# Patient Record
Sex: MALE | Race: WHITE | NOT HISPANIC OR LATINO | Employment: OTHER | URBAN - METROPOLITAN AREA
[De-identification: names, ages, dates, MRNs, and addresses within clinical notes are randomized per-mention and may not be internally consistent; named-entity substitution may affect disease eponyms.]

---

## 2017-01-25 ENCOUNTER — ALLSCRIPTS OFFICE VISIT (OUTPATIENT)
Dept: OTHER | Facility: OTHER | Age: 72
End: 2017-01-25

## 2017-01-25 ENCOUNTER — HOSPITAL ENCOUNTER (OUTPATIENT)
Dept: RADIOLOGY | Facility: CLINIC | Age: 72
Discharge: HOME | End: 2017-01-25
Payer: MEDICARE

## 2017-01-25 DIAGNOSIS — M25.552 PAIN IN LEFT HIP: ICD-10-CM

## 2017-01-25 PROCEDURE — 73502 X-RAY EXAM HIP UNI 2-3 VIEWS: CPT

## 2017-01-26 ENCOUNTER — GENERIC CONVERSION - ENCOUNTER (OUTPATIENT)
Dept: OTHER | Facility: OTHER | Age: 72
End: 2017-01-26

## 2017-01-26 ENCOUNTER — ALLSCRIPTS OFFICE VISIT (OUTPATIENT)
Dept: RADIOLOGY | Facility: CLINIC | Age: 72
End: 2017-01-26
Payer: MEDICARE

## 2017-02-01 ENCOUNTER — GENERIC CONVERSION - ENCOUNTER (OUTPATIENT)
Dept: OTHER | Facility: OTHER | Age: 72
End: 2017-02-01

## 2018-01-09 NOTE — RESULT NOTES
Message   Recorded as Task   Date: 01/31/2017 10:26 AM, Created By: Joann Carlton   Task Name: Follow Up   Assigned To: [facility],Team   Regarding Patient: Jose De Jesus Taylor, Status: Active   Comment:    Chata Paul - 31 Jan 2017 10:26 AM     TASK CREATED  S/p Left L4-5 TFESI #1 on 1/26/17 w/SL in [place]  LT L4-5 TFESI #2 scheduled for 2/10/17    Please call 2/2/17   Lily Landeros - 01 Feb 2017 12:52 PM     TASK EDITED  Spoke with pt- states his back feels good but his (L) groin is tight  Started PT 1/31/17 and IBU with some relief  Pain level 0/10   Feels about 100% relief  Task to SL for Meridian Coad - 01 Feb 2017 12:53 PM     TASK EDITED  He would like to CX his TFESI #2 since he is feeling better  Adria Encinas - 01 Feb 2017 1:21 PM     TASK REPLIED TO: Previously Assigned To Adria Encinas  please cancel-thank you   Penelope Wolf - 01 Feb 2017 1:26 PM     TASK EDITED  cancelled          Signatures   Electronically signed by : Venessa Almonte, ; Feb 1 2017  1:27PM EST                       (Author)

## 2018-01-10 NOTE — MISCELLANEOUS
Message   Recorded as Task   Date: 01/25/2017 03:56 PM, Created By: Mouna Patricks[place] [facility]   Task Name: Follow Up   Assigned To: [facility] [place] [facility],Team   Regarding Patient: Son Gonzalez, Status: In Progress   Comment:    Adria Encinas - 25 Jan 2017 3:56 PM     TASK CREATED  Please call patient let him know radiographs revealed mild hip arthritis  At this point as continue with the plan and pursue the lumbar injection, and that would serve both diagnostic and hopefully therapeutic purposes  Zay Rose - 26 Jan 2017 8:56 AM     TASK EDITED  pt seen for procedure on 1/26  LMOM to cb on home #     will review x-ray results prn  Zay Rose - 26 Jan 2017 10:02 AM     TASK EDITED  s/w pt, no questions re: x-ray results as reviewed by Dr Gallo Enriquez  pt to proceed w/ post procedure instructions as provided  Will cb w/ questions or concerns  Active Problems    1  Intervertebral disc disorder with radiculopathy of lumbosacral region (724 4) (M51 17)   2  Left hip pain (719 45) (M25 552)   3  Left low back pain (724 2) (M54 5)   4  Left lumbar radiculitis (724 4) (M54 16)   5  Lumbar foraminal stenosis (724 02) (M99 83)   6  Spinal stenosis of lumbosacral region (724 02) (M48 07)    Current Meds   1  Advil CAPS; Therapy: (Lesvia Rocher) to Recorded   2  Aspir-81 TBEC; Therapy: (Lesvia Rocher) to Recorded   3  Atorvastatin Calcium 20 MG Oral Tablet; Therapy: (Lesvia Rocher) to Recorded   4  CoQ10 CAPS; Therapy: (Lesvia Rocher) to Recorded   5  Lisinopril 20 MG Oral Tablet; Therapy: (Lesvia Rocher) to Recorded   6  Multivitamins TABS; Therapy: (Lesvia Rocher) to Recorded   7  Omeprazole TBEC; Therapy: (Lesvia Rocher) to Recorded   8  Probiotic CAPS; Therapy: (Lesvia Rocher) to Recorded   9  Tiger Chaseley Pain Relieving PTCH; Therapy: (Lesvia Rocher) to Recorded   10  Toprol XL 25 MG Oral Tablet Extended Release 24 Hour (Metoprolol Succinate ER);     Therapy: (Lyn Griggs) to Recorded   11  TraMADol HCl - 50 MG Oral Tablet; Therapy: (Recorded:25Jan2017) to Recorded    Allergies    1   No Known Drug Allergies    Signatures   Electronically signed by : Ramana Mata, ; Jan 26 2017 10:02AM EST                       (Author)

## 2018-01-13 VITALS
SYSTOLIC BLOOD PRESSURE: 150 MMHG | HEIGHT: 69 IN | WEIGHT: 217 LBS | HEART RATE: 84 BPM | TEMPERATURE: 98.6 F | DIASTOLIC BLOOD PRESSURE: 90 MMHG | BODY MASS INDEX: 32.14 KG/M2

## 2018-08-10 ENCOUNTER — HOSPITAL ENCOUNTER (EMERGENCY)
Facility: HOSPITAL | Age: 73
Discharge: HOME | End: 2018-08-10
Attending: EMERGENCY MEDICINE
Payer: MEDICARE

## 2018-08-10 VITALS
HEART RATE: 66 BPM | DIASTOLIC BLOOD PRESSURE: 63 MMHG | SYSTOLIC BLOOD PRESSURE: 135 MMHG | WEIGHT: 222 LBS | OXYGEN SATURATION: 97 % | TEMPERATURE: 97.8 F | BODY MASS INDEX: 32.78 KG/M2 | RESPIRATION RATE: 15 BRPM

## 2018-08-10 DIAGNOSIS — L03.115 CELLULITIS OF RIGHT LEG: Primary | ICD-10-CM

## 2018-08-10 PROCEDURE — 99283 EMERGENCY DEPT VISIT LOW MDM: CPT

## 2018-08-10 RX ORDER — SACCHAROMYCES BOULARDII 250 MG
250 CAPSULE ORAL 2 TIMES DAILY
COMMUNITY

## 2018-08-10 RX ORDER — B-COMPLEX WITH VITAMIN C
250 TABLET ORAL DAILY
COMMUNITY

## 2018-08-10 RX ORDER — AMLODIPINE BESYLATE 5 MG/1
5 TABLET ORAL DAILY
COMMUNITY

## 2018-08-10 RX ORDER — CEPHALEXIN 500 MG/1
500 CAPSULE ORAL EVERY 8 HOURS SCHEDULED
Qty: 21 CAPSULE | Refills: 0 | Status: SHIPPED | OUTPATIENT
Start: 2018-08-10 | End: 2018-08-17

## 2018-08-10 RX ORDER — LISINOPRIL 20 MG/1
20 TABLET ORAL 2 TIMES DAILY
COMMUNITY

## 2018-08-10 RX ORDER — GLUCOSAMINE/D3/BOSWELLIA SERRA 1500MG-400
5000 TABLET ORAL DAILY
COMMUNITY

## 2018-08-10 RX ORDER — ATORVASTATIN CALCIUM 20 MG/1
20 TABLET, FILM COATED ORAL DAILY
COMMUNITY

## 2018-08-10 RX ORDER — METOPROLOL SUCCINATE 25 MG/1
25 TABLET, EXTENDED RELEASE ORAL DAILY
COMMUNITY

## 2018-08-10 RX ORDER — LANOLIN ALCOHOL/MO/W.PET/CERES
5000 CREAM (GRAM) TOPICAL DAILY
COMMUNITY

## 2018-08-10 RX ORDER — CEPHALEXIN 250 MG/1
500 CAPSULE ORAL ONCE
Status: COMPLETED | OUTPATIENT
Start: 2018-08-10 | End: 2018-08-10

## 2018-08-10 RX ORDER — OMEPRAZOLE 20 MG/1
20 CAPSULE, DELAYED RELEASE ORAL DAILY
COMMUNITY

## 2018-08-10 RX ORDER — ASPIRIN 81 MG/1
81 TABLET ORAL DAILY
COMMUNITY

## 2018-08-10 RX ADMIN — CEPHALEXIN 500 MG: 250 CAPSULE ORAL at 21:50

## 2018-08-11 NOTE — DISCHARGE INSTRUCTIONS
Cellulitis, Ambulatory Care   GENERAL INFORMATION:   Cellulitis  is a skin infection caused by bacteria  Common symptoms include the following:   · Fever    · A red, warm, swollen area on your skin    · Pain when the area is touched    · Bumps or blisters (abscess) that may drain pus    · Bumpy, raised skin that feels like an orange peel  Seek immediate care for the following symptoms:   · An increase in pain, redness, warmth, and size    · Red streaks coming from the infected area    · A thin, gray-brown discharge coming from your infected skin area    · A crackling under your skin when you touch it    · Purple dots or bumps on your skin, or bleeding under your skin    · New swelling and pain in your legs    · Sudden trouble breathing or chest pain  Treatment for cellulitis  may include medicines to treat the bacterial infection or decrease pain  The infection may need to be cleaned out  Damaged, dead, or infected tissue may need to be cut away to help your wound heal   Manage your symptoms:   · Elevate your wound above the level of your heart  as often as you can  This will help decrease swelling and pain  Prop your wound on pillows or blankets to keep it elevated comfortably  · Clean your wound as directed  You may need to wash the wound with soap and water  Look for signs of infection  · Wear pressure stockings as directed  The stockings are tight and put pressure on your legs  This improves blood flow and decreases swelling  Prevent cellulitis:   · Wash your hands often  Use soap and water  Wash your hands after you use the bathroom, change a child's diapers, or sneeze  Wash your hands before you prepare or eat food  Use lotion to prevent dry, cracked skin  · Do not share personal items, such as towels, clothing, and razors  · Clean exercise equipment  with germ-killing  before and after you use it    Follow up with your healthcare provider as directed:  Write down your questions so you remember to ask them during your visits  CARE AGREEMENT:   You have the right to help plan your care  Learn about your health condition and how it may be treated  Discuss treatment options with your caregivers to decide what care you want to receive  You always have the right to refuse treatment  The above information is an  only  It is not intended as medical advice for individual conditions or treatments  Talk to your doctor, nurse or pharmacist before following any medical regimen to see if it is safe and effective for you  © 2014 [address] is for End User's use only and may not be sold, redistributed or otherwise used for commercial purposes  All illustrations and images included in CareNotes® are the copyrighted property of [employer]  or Altaf Nash

## 2018-08-11 NOTE — ED PROVIDER NOTES
History  Chief Complaint   Patient presents with    Knee Swelling     Redness and swelling behind left knee; currently is getting physical therapy to the knee for arthritic pain  68 yo male w/ hx of HTN and CAD presents to the ED for evaluation of R posterior knee swelling and redness x 24 hours  He undergoes PT for chronic R knee OA, states that his therapy has been helping  No new modalities used today  Denies pain or difficulty ambulating  No fever, chills or sweats  No recent surgical procedures or prolonged immobilization  Prior to Admission Medications   Prescriptions Last Dose Informant Patient Reported? Taking? Biotin 63741 MCG TABS   Yes Yes   Sig: Take 5,000 mcg by mouth daily   Thiamine HCl (VITAMIN B-1) 250 MG tablet   Yes Yes   Sig: Take 250 mg by mouth daily   amLODIPine (NORVASC) 5 mg tablet   Yes Yes   Sig: Take 5 mg by mouth daily   aspirin (ECOTRIN LOW STRENGTH) 81 mg EC tablet   Yes Yes   Sig: Take 81 mg by mouth daily   atorvastatin (LIPITOR) 20 mg tablet   Yes Yes   Sig: Take 20 mg by mouth daily   cyanocobalamin (VITAMIN B-12) 1,000 mcg tablet   Yes Yes   Sig: Take 5,000 mcg by mouth daily   lisinopril (ZESTRIL) 20 mg tablet   Yes Yes   Sig: Take 20 mg by mouth 2 (two) times a day   metoprolol succinate (TOPROL-XL) 25 mg 24 hr tablet   Yes Yes   Sig: Take 25 mg by mouth daily   omeprazole (PriLOSEC) 20 mg delayed release capsule   Yes Yes   Sig: Take 20 mg by mouth daily   saccharomyces boulardii (FLORASTOR) 250 mg capsule   Yes Yes   Sig: Take 250 mg by mouth 2 (two) times a day      Facility-Administered Medications: None       Past Medical History:   Diagnosis Date    Arthritis     Cardiac disease     GERD (gastroesophageal reflux disease)     Hypertension     Sleep apnea        Past Surgical History:   Procedure Laterality Date    CARDIAC SURGERY      CABG and 5 stents    JOINT REPLACEMENT         History reviewed  No pertinent family history    I have reviewed and agree with the history as documented  Social History   Substance Use Topics    Smoking status: Never Smoker    Smokeless tobacco: Never Used    Alcohol use No        Review of Systems   Constitutional: Negative for fever  Gastrointestinal: Negative for nausea and vomiting  Musculoskeletal: Negative for arthralgias, gait problem, joint swelling and myalgias  Skin: Positive for color change  Negative for wound  Neurological: Negative for dizziness, weakness and numbness  Physical Exam  Physical Exam   Constitutional: He is oriented to person, place, and time  He appears well-developed and well-nourished  No distress  HENT:   Head: Normocephalic and atraumatic  Eyes: Pupils are equal, round, and reactive to light  No scleral icterus  Neck: No JVD present  Cardiovascular: Normal rate and regular rhythm  Exam reveals no gallop and no friction rub  No murmur heard  Pulses:       Dorsalis pedis pulses are 2+ on the right side, and 2+ on the left side  Pulmonary/Chest: No respiratory distress  He has no wheezes  He has no rales  Musculoskeletal:        Right knee: He exhibits erythema  He exhibits normal range of motion, no effusion, no ecchymosis and no bony tenderness  No tenderness found  Legs:  Neurological: He is alert and oriented to person, place, and time  Skin: Skin is warm and dry  He is not diaphoretic  Vitals reviewed        Vital Signs  ED Triage Vitals [08/10/18 2125]   Temperature Pulse Respirations Blood Pressure SpO2   97 8 °F (36 6 °C) 73 15 (!) 176/80 98 %      Temp Source Heart Rate Source Patient Position - Orthostatic VS BP Location FiO2 (%)   Temporal Monitor -- -- --      Pain Score       2           Vitals:    08/10/18 2125 08/10/18 2145   BP: (!) 176/80 135/63   Pulse: 73 66       Visual Acuity      ED Medications  Medications   cephalexin (KEFLEX) capsule 500 mg (500 mg Oral Given 8/10/18 2150)       Diagnostic Studies  Results Reviewed     None No orders to display              Procedures  Procedures       Phone Contacts  ED Phone Contact    ED Course                               MDM  Number of Diagnoses or Management Options  Cellulitis of right leg: new and does not require workup  Diagnosis management comments: 66 yo male presents to the ED w/ new onset R leg erythema  Findings are suspicious for cellulitis  There is no knee effusion concerning for septic arthritis  Pt is well appearing and afebrile  Candidate for outpatient management  Amount and/or Complexity of Data Reviewed  Review and summarize past medical records: yes      CritCare Time    Disposition  Final diagnoses:   Cellulitis of right leg     Time reflects when diagnosis was documented in both MDM as applicable and the Disposition within this note     Time User Action Codes Description Comment    8/10/2018  9:44 PM Becky Nathaniel Add [K92 [id number]] Cellulitis of right leg       ED Disposition     ED Disposition Condition Comment    Discharge  Doreen Pacer Sefing discharge to home/self care      Condition at discharge: Good        Follow-up Information     Follow up With Specialties Details Why Contact Eileen Walker    [address]  [phone]            Discharge Medication List as of 8/10/2018  9:45 PM      START taking these medications    Details   cephalexin (KEFLEX) 500 mg capsule Take 1 capsule (500 mg total) by mouth every 8 (eight) hours for 7 days, Starting Fri 8/10/2018, Until Fri 8/17/2018, Print         CONTINUE these medications which have NOT CHANGED    Details   amLODIPine (NORVASC) 5 mg tablet Take 5 mg by mouth daily, Historical Med      aspirin (ECOTRIN LOW STRENGTH) 81 mg EC tablet Take 81 mg by mouth daily, Historical Med      atorvastatin (LIPITOR) 20 mg tablet Take 20 mg by mouth daily, Historical Med      Biotin 55136 MCG TABS Take 5,000 mcg by mouth daily, Historical Med      cyanocobalamin (VITAMIN B-12) 1,000 mcg tablet Take 5,000 mcg by mouth daily, Historical Med      lisinopril (ZESTRIL) 20 mg tablet Take 20 mg by mouth 2 (two) times a day, Historical Med      metoprolol succinate (TOPROL-XL) 25 mg 24 hr tablet Take 25 mg by mouth daily, Historical Med      omeprazole (PriLOSEC) 20 mg delayed release capsule Take 20 mg by mouth daily, Historical Med      saccharomyces boulardii (FLORASTOR) 250 mg capsule Take 250 mg by mouth 2 (two) times a day, Historical Med      Thiamine HCl (VITAMIN B-1) 250 MG tablet Take 250 mg by mouth daily, Historical Med           No discharge procedures on file      ED Provider  Electronically Signed by           Chandler Pagan PA-C  08/10/18 4851

## 2020-02-04 ENCOUNTER — OFFICE VISIT (OUTPATIENT)
Dept: PAIN MEDICINE | Facility: CLINIC | Age: 75
End: 2020-02-04
Payer: MEDICARE

## 2020-02-04 VITALS
HEIGHT: 70 IN | BODY MASS INDEX: 33.5 KG/M2 | SYSTOLIC BLOOD PRESSURE: 132 MMHG | DIASTOLIC BLOOD PRESSURE: 76 MMHG | WEIGHT: 234 LBS | HEART RATE: 63 BPM

## 2020-02-04 DIAGNOSIS — M54.16 LUMBAR RADICULOPATHY: ICD-10-CM

## 2020-02-04 DIAGNOSIS — M51.26 LUMBAR DISC HERNIATION: Primary | ICD-10-CM

## 2020-02-04 DIAGNOSIS — M79.18 MYOFASCIAL PAIN SYNDROME: ICD-10-CM

## 2020-02-04 DIAGNOSIS — M48.07 SPINAL STENOSIS OF LUMBOSACRAL REGION: ICD-10-CM

## 2020-02-04 PROBLEM — M25.552 LEFT HIP PAIN: Status: ACTIVE | Noted: 2017-01-25

## 2020-02-04 PROBLEM — M51.17 INTERVERTEBRAL DISC DISORDER WITH RADICULOPATHY OF LUMBOSACRAL REGION: Status: ACTIVE | Noted: 2017-01-25

## 2020-02-04 PROBLEM — M48.061 LUMBAR FORAMINAL STENOSIS: Status: ACTIVE | Noted: 2017-01-25

## 2020-02-04 PROBLEM — M54.50 LEFT LOW BACK PAIN: Status: ACTIVE | Noted: 2017-01-25

## 2020-02-04 PROCEDURE — 99214 OFFICE O/P EST MOD 30 MIN: CPT | Performed by: ANESTHESIOLOGY

## 2020-02-04 NOTE — PROGRESS NOTES
Assessment  1  Lumbar disc herniation    2  Lumbar radiculopathy    3  Myofascial pain syndrome    4  Spinal stenosis of lumbosacral region        Plan  In review patient was seen three years ago received one epidural steroid injection with excellent relief  Pain started return approximately six months ago  I believe his pain is muliti-modal     I believe the thoracic and scapular pain is secondary to trigger point activity in for I have referred him to Dr Christ Pollard for evaluation and consideration of treatment  Regards to his low back and right leg pain I do believe it be appropriate to proceed with lumbar epidural steroid injection  Initially use an interlaminar approach he does have some bilateral symptoms  Complete risks and benefits including bleeding, infection, tissue reaction, nerve injury and allergic reaction were discussed  The approach was demonstrated using models and literature was provided  Verbal and written consent was obtained  My impressions and treatment recommendations were discussed in detail with the patient who verbalized understanding and had no further questions  Discharge instructions were provided  I personally saw and examined the patient and I agree with the above discussed plan of care  This note is created using dictation transcription  It may contain typographical errors, grammatical errors, improperly dictated words, background noise and other errors  Orders Placed This Encounter   Procedures    FL spine and pain procedure     Standing Status:   Future     Standing Expiration Date:   2/4/2024     Order Specific Question:   Reason for Exam:     Answer:   LESI 1  Order Specific Question:   Anticoagulant hold needed? Answer:   No    FL spine and pain procedure     Standing Status:   Future     Standing Expiration Date:   2/4/2024     Order Specific Question:   Reason for Exam:     Answer:   LESI 2  Order Specific Question:   Anticoagulant hold needed? Answer:   No    Ambulatory referral to Neurology     Standing Status:   Future     Standing Expiration Date:   2/4/2021     Referral Priority:   Routine     Referral Type:   Consult - AMB     Referral Reason:   Specialty Services Required     Referred to Provider:   Charu Salomon     Requested Specialty:   Neurology     Number of Visits Requested:   1     Expiration Date:   2/4/2021         History of Present Illness    Annie Castillo is a 75 y o  male who was seen approximately three years ago for left lower extremity symptoms  He underwent a transforaminal epidural steroid injections with two and half years of relief  His pain is somewhat different in nature in character does cross his low back occasionally into his right leg  He also has pain as scapular and thoracic region  His symptoms are moderate to severe rates anywhere between six to 7/10 on the visual analog scale interfering with his daily living activities  His pain is intermittent but worse in the evening describes dull achy denies any weakness of the upper lower limbs  He does report that lying down, walking, relaxation decreases symptoms will standing, bending and sitting aggravate them  Physical therapy exercise heat nice have provided moderate relief  Denies any bowel or bladder dysfunction  I have personally reviewed and/or updated the patient's past medical history, past surgical history, family history, social history, current medications, allergies, and vital signs today  Review of Systems   Constitutional: Negative for fever and unexpected weight change  HENT: Negative for trouble swallowing  Eyes: Negative for visual disturbance  Respiratory: Negative for shortness of breath and wheezing  Cardiovascular: Negative for chest pain and palpitations  Gastrointestinal: Negative for constipation, diarrhea, nausea and vomiting  Endocrine: Negative for cold intolerance, heat intolerance and polydipsia     Genitourinary: Negative for difficulty urinating and frequency  Musculoskeletal: Positive for gait problem (decreased rom) and joint swelling (joint stiffness)  Negative for arthralgias and myalgias  Skin: Negative for rash  Neurological: Negative for dizziness, seizures, syncope, weakness and headaches  Hematological: Does not bruise/bleed easily  Psychiatric/Behavioral: Negative for dysphoric mood  All other systems reviewed and are negative  Patient Active Problem List   Diagnosis    Intervertebral disc disorder with radiculopathy of lumbosacral region    Left hip pain    Left low back pain    Lumbar foraminal stenosis    Spinal stenosis of lumbosacral region       Past Medical History:   Diagnosis Date    Arthritis     Cardiac disease     GERD (gastroesophageal reflux disease)     Hypertension     Sleep apnea        Past Surgical History:   Procedure Laterality Date    CARDIAC SURGERY      CABG and 5 stents    JOINT REPLACEMENT         History reviewed  No pertinent family history      Social History     Occupational History    Not on file   Tobacco Use    Smoking status: Never Smoker    Smokeless tobacco: Never Used   Substance and Sexual Activity    Alcohol use: No    Drug use: No    Sexual activity: Not on file       Current Outpatient Medications on File Prior to Visit   Medication Sig    amLODIPine (NORVASC) 5 mg tablet Take 5 mg by mouth daily    aspirin (ECOTRIN LOW STRENGTH) 81 mg EC tablet Take 81 mg by mouth daily    atorvastatin (LIPITOR) 20 mg tablet Take 20 mg by mouth daily    Biotin 41102 MCG TABS Take 5,000 mcg by mouth daily    cyanocobalamin (VITAMIN B-12) 1,000 mcg tablet Take 5,000 mcg by mouth daily    lisinopril (ZESTRIL) 20 mg tablet Take 20 mg by mouth 2 (two) times a day    metoprolol succinate (TOPROL-XL) 25 mg 24 hr tablet Take 25 mg by mouth daily    omeprazole (PriLOSEC) 20 mg delayed release capsule Take 20 mg by mouth daily    saccharomyces boulardii (FLORASTOR) 250 mg capsule Take 250 mg by mouth 2 (two) times a day    Thiamine HCl (VITAMIN B-1) 250 MG tablet Take 250 mg by mouth daily     No current facility-administered medications on file prior to visit  No Known Allergies    Physical Exam    /76   Pulse 63   Ht 5' 10" (1 778 m)   Wt 106 kg (234 lb)   BMI 33 58 kg/m²     Constitutional: normal, well developed, well nourished, alert, in no distress and non-toxic and no overt pain behavior  and overweight  Eyes: anicteric  HEENT: grossly intact  Neck: supple, symmetric, trachea midline and no masses   Pulmonary:even and unlabored  Cardiovascular:No edema or pitting edema present  Skin:Normal without rashes or lesions and well hydrated  Psychiatric:Mood and affect appropriate  Neurologic:Cranial Nerves II-XII grossly intact  Musculoskeletal:normal, difficulty going from sitting to standing to sitting position no obvious skin lesions or erythema lumbar sacral spine no tenderness to palpation lumbar paravertebral is there is right greater than left thoracic paravertebral tenderness and left greater than right scapular tenderness  No motor deficit appreciated the upper lower limbs, deep tendon reflexes are 2+ and symmetrical bilateral upper lower limbs, no sensory deficit appreciated lower limbs negative bilateral straight leg raising and negative Kye's maneuver  Imaging  MRI:  Lumbar 12/28/16  :     Multilevel degenerative disc disease    Mild thecal sac stenosis across the L1-L2 and L2-L3 levels, as discussed above    Right lateral recess stenosis at the L3-L4 level and bilateral recess stenosis at the L4-L5 Level    Left neural foraminal stenosis at the L5-S1 level

## 2020-02-06 ENCOUNTER — HOSPITAL ENCOUNTER (OUTPATIENT)
Dept: RADIOLOGY | Facility: CLINIC | Age: 75
Discharge: HOME | End: 2020-02-06
Attending: ANESTHESIOLOGY
Payer: MEDICARE

## 2020-02-06 VITALS
TEMPERATURE: 97.7 F | RESPIRATION RATE: 20 BRPM | DIASTOLIC BLOOD PRESSURE: 93 MMHG | OXYGEN SATURATION: 97 % | SYSTOLIC BLOOD PRESSURE: 167 MMHG | HEART RATE: 63 BPM

## 2020-02-06 DIAGNOSIS — M51.26 LUMBAR DISC HERNIATION: ICD-10-CM

## 2020-02-06 DIAGNOSIS — M48.07 SPINAL STENOSIS OF LUMBOSACRAL REGION: ICD-10-CM

## 2020-02-06 DIAGNOSIS — M54.16 LUMBAR RADICULOPATHY: ICD-10-CM

## 2020-02-06 PROCEDURE — 62323 NJX INTERLAMINAR LMBR/SAC: CPT | Performed by: ANESTHESIOLOGY

## 2020-02-06 RX ORDER — LIDOCAINE HYDROCHLORIDE 10 MG/ML
5 INJECTION, SOLUTION EPIDURAL; INFILTRATION; INTRACAUDAL; PERINEURAL ONCE
Status: COMPLETED | OUTPATIENT
Start: 2020-02-06 | End: 2020-02-06

## 2020-02-06 RX ORDER — B-COMPLEX WITH VITAMIN C
TABLET ORAL EVERY 24 HOURS
COMMUNITY

## 2020-02-06 RX ORDER — METHYLPREDNISOLONE ACETATE 80 MG/ML
160 INJECTION, SUSPENSION INTRA-ARTICULAR; INTRALESIONAL; INTRAMUSCULAR; PARENTERAL; SOFT TISSUE ONCE
Status: COMPLETED | OUTPATIENT
Start: 2020-02-06 | End: 2020-02-06

## 2020-02-06 RX ADMIN — IOHEXOL 1 ML: 300 INJECTION, SOLUTION INTRAVENOUS at 09:15

## 2020-02-06 RX ADMIN — LIDOCAINE HYDROCHLORIDE 3 ML: 10 INJECTION, SOLUTION EPIDURAL; INFILTRATION; INTRACAUDAL; PERINEURAL at 09:14

## 2020-02-06 RX ADMIN — METHYLPREDNISOLONE ACETATE 160 MG: 80 INJECTION, SUSPENSION INTRA-ARTICULAR; INTRALESIONAL; INTRAMUSCULAR; SOFT TISSUE at 09:16

## 2020-02-06 NOTE — H&P
History of Present Illness: The patient is a 75 y o  male who presents with complaints of low back and leg pain      Patient Active Problem List   Diagnosis    Intervertebral disc disorder with radiculopathy of lumbosacral region    Left hip pain    Left low back pain    Lumbar foraminal stenosis    Spinal stenosis of lumbosacral region    Lumbar disc herniation    Lumbar radiculopathy       Past Medical History:   Diagnosis Date    Arthritis     Cardiac disease     GERD (gastroesophageal reflux disease)     Hypertension     Sleep apnea        Past Surgical History:   Procedure Laterality Date    CARDIAC SURGERY      CABG and 5 stents    JOINT REPLACEMENT           Current Outpatient Medications:     amLODIPine (NORVASC) 5 mg tablet, Take 5 mg by mouth daily, Disp: , Rfl:     aspirin (ECOTRIN LOW STRENGTH) 81 mg EC tablet, Take 81 mg by mouth daily, Disp: , Rfl:     atorvastatin (LIPITOR) 20 mg tablet, Take 20 mg by mouth daily, Disp: , Rfl:     Biotin 99618 MCG TABS, Take 5,000 mcg by mouth daily, Disp: , Rfl:     cyanocobalamin (VITAMIN B-12) 1,000 mcg tablet, Take 5,000 mcg by mouth daily, Disp: , Rfl:     lisinopril (ZESTRIL) 20 mg tablet, Take 20 mg by mouth 2 (two) times a day, Disp: , Rfl:     Magnesium 400 MG CAPS, , Disp: , Rfl:     metoprolol succinate (TOPROL-XL) 25 mg 24 hr tablet, Take 25 mg by mouth daily, Disp: , Rfl:     Misc Natural Products (PROSTATE HEALTH PO), every 24 hours, Disp: , Rfl:     omeprazole (PriLOSEC) 20 mg delayed release capsule, Take 20 mg by mouth daily, Disp: , Rfl:     Pediatric Multivitamins-Fl (MULTIVITAMINS/FL PO), every 24 hours, Disp: , Rfl:     saccharomyces boulardii (FLORASTOR) 250 mg capsule, Take 250 mg by mouth 2 (two) times a day, Disp: , Rfl:     Thiamine HCl (VITAMIN B-1) 250 MG tablet, Take 250 mg by mouth daily, Disp: , Rfl:     Thiamine HCl (VITAMIN B-1) 250 MG tablet, every 24 hours, Disp: , Rfl:     Current Facility-Administered Medications:     iohexol (OMNIPAQUE) 300 mg/mL injection 50 mL, 50 mL, Epidural, Once, Adria Encinas DO    lidocaine (PF) (XYLOCAINE-MPF) 1 % injection 5 mL, 5 mL, Other, Once, Adria Encinas DO    methylPREDNISolone acetate (DEPO-MEDROL) injection 160 mg, 160 mg, Epidural, Once, Adria Encinas DO    No Known Allergies    Physical Exam:   Vitals:    02/06/20 0907   BP: 150/83   Pulse: 59   Resp: 20   Temp: 97 7 °F (36 5 °C)   SpO2: 96%     General: Awake, Alert, Oriented x 3, Mood and affect appropriate  Respiratory: Respirations even and unlabored  Cardiovascular: Peripheral pulses intact; no edema  Musculoskeletal Exam:  Decreased range of motion lumbar spine    ASA Score: II    Patient/Chart Verification  Patient ID Verified: Verbal  ID Band Applied: No  Consents Confirmed: Procedural  H&P( within 30 days) Verified: To be obtained in the Pre-Procedure area  Interval H&P(within 24 hr) Complete (required for Outpatients and Surgery Admit only): To be obtained in the Pre-Procedure area  Allergies Reviewed: Yes  Anticoag/NSAID held?: NA  Currently on antibiotics?: No    Assessment:   1  Lumbar disc herniation    2  Lumbar radiculopathy    3  Spinal stenosis of lumbosacral region        Plan: BARNEY Berg

## 2020-02-06 NOTE — DISCHARGE INSTRUCTIONS
Epidural Steroid Injection   WHAT YOU NEED TO KNOW:   An epidural steroid injection (MARTINEZ) is a procedure to inject steroid medicine into the epidural space  The epidural space is between your spinal cord and vertebrae  Steroids reduce inflammation and fluid buildup in your spine that may be causing pain  You may be given pain medicine along with the steroids  ACTIVITY  · Do not drive or operate machinery today  · No strenuous activity today - bending, lifting, etc   · You may resume normal activites starting tomorrow - start slowly and as tolerated  · You may shower today, but no tub baths or hot tubs  · You may have numbness for several hours from the local anesthetic  Please use caution and common sense, especially with weight-bearing activities  CARE OF THE INJECTION SITE  · If you have soreness or pain, apply ice to the area today (20 minutes on/20 minutes off)  · Starting tomorrow, you may use warm, moist heat or ice if needed  · You may have an increase or change in your discomfort for 36-48 hours after your treatment  · Apply ice and continue with any pain medication you have been prescribed  · [facility] if you have any of the following: redness, drainage, swelling, headache, stiff neck or fever above 100°F     SPECIAL INSTRUCTIONS  · Our office will contact you in approximately 7 days for a progress report  MEDICATIONS  · Continue to take all routine medications  · Our office may have instructed you to hold some medications  If you have a problem specifically related to your procedure, please call our office at [phone]  Problems not related to your procedure should be directed to your primary care physician

## 2020-02-13 ENCOUNTER — TELEPHONE (OUTPATIENT)
Dept: PAIN MEDICINE | Facility: CLINIC | Age: 75
End: 2020-02-13

## 2020-02-13 NOTE — TELEPHONE ENCOUNTER
Dr Isabela Madrigal Procedure F/u (S/pLESI #1  F/u LESI #2 2/24  )       Spoke with pt who states that his pain is a 2 with  50% relief from the injection and that he will be coming in for the next one to hopefully get a little more relief       Follow up injection 2/24 LES #2

## 2020-02-24 ENCOUNTER — HOSPITAL ENCOUNTER (OUTPATIENT)
Dept: RADIOLOGY | Facility: CLINIC | Age: 75
Discharge: HOME | End: 2020-02-24
Attending: ANESTHESIOLOGY | Admitting: ANESTHESIOLOGY
Payer: MEDICARE

## 2020-02-24 VITALS
HEART RATE: 55 BPM | DIASTOLIC BLOOD PRESSURE: 75 MMHG | RESPIRATION RATE: 20 BRPM | OXYGEN SATURATION: 98 % | SYSTOLIC BLOOD PRESSURE: 137 MMHG | TEMPERATURE: 97.5 F

## 2020-02-24 DIAGNOSIS — M54.16 LUMBAR RADICULOPATHY: ICD-10-CM

## 2020-02-24 DIAGNOSIS — M48.07 SPINAL STENOSIS OF LUMBOSACRAL REGION: ICD-10-CM

## 2020-02-24 DIAGNOSIS — M51.26 LUMBAR DISC HERNIATION: ICD-10-CM

## 2020-02-24 PROCEDURE — 62323 NJX INTERLAMINAR LMBR/SAC: CPT | Performed by: ANESTHESIOLOGY

## 2020-02-24 RX ORDER — LIDOCAINE HYDROCHLORIDE 10 MG/ML
5 INJECTION, SOLUTION EPIDURAL; INFILTRATION; INTRACAUDAL; PERINEURAL ONCE
Status: COMPLETED | OUTPATIENT
Start: 2020-02-24 | End: 2020-02-24

## 2020-02-24 RX ORDER — METHYLPREDNISOLONE ACETATE 80 MG/ML
160 INJECTION, SUSPENSION INTRA-ARTICULAR; INTRALESIONAL; INTRAMUSCULAR; PARENTERAL; SOFT TISSUE ONCE
Status: COMPLETED | OUTPATIENT
Start: 2020-02-24 | End: 2020-02-24

## 2020-02-24 RX ADMIN — LIDOCAINE HYDROCHLORIDE 3 ML: 10 INJECTION, SOLUTION EPIDURAL; INFILTRATION; INTRACAUDAL; PERINEURAL at 09:49

## 2020-02-24 RX ADMIN — IOHEXOL 1 ML: 300 INJECTION, SOLUTION INTRAVENOUS at 09:50

## 2020-02-24 RX ADMIN — METHYLPREDNISOLONE ACETATE 160 MG: 80 INJECTION, SUSPENSION INTRA-ARTICULAR; INTRALESIONAL; INTRAMUSCULAR; SOFT TISSUE at 09:51

## 2020-02-24 NOTE — H&P
History of Present Illness: The patient is a 75 y o  male who presents with complaints of low back and leg pain      Patient Active Problem List   Diagnosis    Intervertebral disc disorder with radiculopathy of lumbosacral region    Left hip pain    Left low back pain    Lumbar foraminal stenosis    Spinal stenosis of lumbosacral region    Lumbar disc herniation    Lumbar radiculopathy       Past Medical History:   Diagnosis Date    Arthritis     Cardiac disease     GERD (gastroesophageal reflux disease)     Hypertension     Sleep apnea        Past Surgical History:   Procedure Laterality Date    CARDIAC SURGERY      CABG and 5 stents    JOINT REPLACEMENT           Current Outpatient Medications:     amLODIPine (NORVASC) 5 mg tablet, Take 5 mg by mouth daily, Disp: , Rfl:     aspirin (ECOTRIN LOW STRENGTH) 81 mg EC tablet, Take 81 mg by mouth daily, Disp: , Rfl:     atorvastatin (LIPITOR) 20 mg tablet, Take 20 mg by mouth daily, Disp: , Rfl:     Biotin 25434 MCG TABS, Take 5,000 mcg by mouth daily, Disp: , Rfl:     cyanocobalamin (VITAMIN B-12) 1,000 mcg tablet, Take 5,000 mcg by mouth daily, Disp: , Rfl:     lisinopril (ZESTRIL) 20 mg tablet, Take 20 mg by mouth 2 (two) times a day, Disp: , Rfl:     Magnesium 400 MG CAPS, , Disp: , Rfl:     metoprolol succinate (TOPROL-XL) 25 mg 24 hr tablet, Take 25 mg by mouth daily, Disp: , Rfl:     Misc Natural Products (PROSTATE HEALTH PO), every 24 hours, Disp: , Rfl:     omeprazole (PriLOSEC) 20 mg delayed release capsule, Take 20 mg by mouth daily, Disp: , Rfl:     Pediatric Multivitamins-Fl (MULTIVITAMINS/FL PO), every 24 hours, Disp: , Rfl:     saccharomyces boulardii (FLORASTOR) 250 mg capsule, Take 250 mg by mouth 2 (two) times a day, Disp: , Rfl:     Thiamine HCl (VITAMIN B-1) 250 MG tablet, Take 250 mg by mouth daily, Disp: , Rfl:     Thiamine HCl (VITAMIN B-1) 250 MG tablet, every 24 hours, Disp: , Rfl:     Current Facility-Administered Medications:     iohexol (OMNIPAQUE) 300 mg/mL injection 50 mL, 50 mL, Epidural, Once, Adria Encinas DO    lidocaine (PF) (XYLOCAINE-MPF) 1 % injection 5 mL, 5 mL, Other, Once, Adira Encinas DO    methylPREDNISolone acetate (DEPO-MEDROL) injection 160 mg, 160 mg, Epidural, Once, Adria Encinas DO    No Known Allergies    Physical Exam:   General: Awake, Alert, Oriented x 3, Mood and affect appropriate  Respiratory: Respirations even and unlabored  Cardiovascular: Peripheral pulses intact; no edema  Musculoskeletal Exam:  Decreased range of motion lumbar spine    ASA Score: II         Assessment:   1  Lumbar disc herniation    2  Lumbar radiculopathy    3  Spinal stenosis of lumbosacral region        Plan: LESI 2

## 2020-02-24 NOTE — DISCHARGE INSTRUCTIONS
Epidural Steroid Injection   WHAT YOU NEED TO KNOW:   An epidural steroid injection (MARTINEZ) is a procedure to inject steroid medicine into the epidural space  The epidural space is between your spinal cord and vertebrae  Steroids reduce inflammation and fluid buildup in your spine that may be causing pain  You may be given pain medicine along with the steroids  ACTIVITY  · Do not drive or operate machinery today  · No strenuous activity today - bending, lifting, etc   · You may resume normal activites starting tomorrow - start slowly and as tolerated  · You may shower today, but no tub baths or hot tubs  · You may have numbness for several hours from the local anesthetic  Please use caution and common sense, especially with weight-bearing activities  CARE OF THE INJECTION SITE  · If you have soreness or pain, apply ice to the area today (20 minutes on/20 minutes off)  · Starting tomorrow, you may use warm, moist heat or ice if needed  · You may have an increase or change in your discomfort for 36-48 hours after your treatment  · Apply ice and continue with any pain medication you have been prescribed  · [facility] if you have any of the following: redness, drainage, swelling, headache, stiff neck or fever above 100°F     SPECIAL INSTRUCTIONS  · Our office will contact you in approximately 7 days for a progress report  MEDICATIONS  · Continue to take all routine medications  · Our office may have instructed you to hold some medications  If you have a problem specifically related to your procedure, please call our office at [phone]  Problems not related to your procedure should be directed to your primary care physician

## 2020-03-02 ENCOUNTER — TELEPHONE (OUTPATIENT)
Dept: PAIN MEDICINE | Facility: CLINIC | Age: 75
End: 2020-03-02

## 2020-03-25 PROBLEM — G89.4 CHRONIC PAIN SYNDROME: Status: ACTIVE | Noted: 2020-03-25

## 2020-07-14 ENCOUNTER — OFFICE VISIT (OUTPATIENT)
Dept: PAIN MEDICINE | Facility: CLINIC | Age: 75
End: 2020-07-14
Payer: MEDICARE

## 2020-07-14 VITALS
WEIGHT: 234 LBS | TEMPERATURE: 98.6 F | SYSTOLIC BLOOD PRESSURE: 116 MMHG | BODY MASS INDEX: 33.5 KG/M2 | HEIGHT: 70 IN | DIASTOLIC BLOOD PRESSURE: 70 MMHG

## 2020-07-14 DIAGNOSIS — M48.062 SPINAL STENOSIS OF LUMBAR REGION WITH NEUROGENIC CLAUDICATION: Primary | ICD-10-CM

## 2020-07-14 DIAGNOSIS — M54.16 LUMBAR RADICULOPATHY: ICD-10-CM

## 2020-07-14 PROCEDURE — 99213 OFFICE O/P EST LOW 20 MIN: CPT | Performed by: ANESTHESIOLOGY

## 2020-07-14 NOTE — PATIENT INSTRUCTIONS
Lumbar Radiculopathy   WHAT YOU NEED TO KNOW:   What is lumbar radiculopathy? Lumbar radiculopathy is a painful condition that happens when a nerve in your lumbar spine (lower back) is pinched or irritated  Nerves control feeling and movement in your body  What causes lumbar radiculopathy? You may get a pinched nerve in your lumbar spine if you have disc damage  Discs are natural, spongy cushions between your vertebrae (back bones) that allow your spine to move  Your discs may move out of place and pinch the nerve in your spine  Your risk for a pinched nerve and lumbar radiculopathy increases if:  · You smoke  · You have diabetes, a spinal infection, or a growth in your spine  · You are overweight  · You are male  · You are elderly  What are the signs and symptoms of lumbar radiculopathy? You may have any of the following:  · Pain that moves from your lower back to your buttocks, groin, and the back of your leg  The pain is often felt below your knee  Your pain may worsen when you cough, sneeze, stand, or sit  · Numbness, weakness, or tingling in your back or legs  How is lumbar radiculopathy diagnosed? Your healthcare provider will examine you and ask about your family history of back and leg pain  He may also test you for weakness, numbness, or tingling in your back, buttocks, and legs  Your healthcare provider may ask you to lie on your back and lift your leg to locate your pain  You may have any of the following:  · Blood tests: You may need blood taken to give caregivers information about how your body is working  The blood may be taken from your hand, arm, or IV  · Magnetic resonance imaging (MRI): An MRI machine is used to take a picture of your lower back  Your healthcare provider will use this picture to check for problems and changes in your back bones, nerves, and discs  You will need to lie still during this test  The MRI machine contains a very powerful magnet    Never enter the MRI room with any metal objects  This can cause serious injury  Tell your healthcare provider if you have any metal implants in your body  · X-ray: An x-ray is a picture of your lower back  A lumbar x-ray may show signs of infection or other problems with your spine  · An electromyography (EMG)  test measures the electrical activity of your muscles at rest and with movement  · Computed tomography (CT) scan: A special x-ray machine uses a computer to take pictures of your lower back  It may be used to look at your bones, discs, and nerves  You may be given dye in your IV to help improve the pictures  Tell your healthcare provider if you are allergic to shellfish, or have other allergies or medical conditions  People who are allergic to iodine or shellfish (lobster, crab, or shrimp) may be allergic to some dyes  How is lumbar radiculopathy treated? Treatment of lumbar radiculopathy may reduce pain and swelling, and improve your ability to walk and do your normal activities  Ask your healthcare provider for more information about these and other treatments for lumbar radiculopathy:  · Medicines:     ¨ NSAIDs , such as ibuprofen, help decrease swelling, pain, and fever  This medicine is available with or without a doctor's order  NSAIDs can cause stomach bleeding or kidney problems in certain people  If you take blood thinner medicine, always ask your healthcare provider if NSAIDs are safe for you  Always read the medicine label and follow directions  ¨ Muscle relaxers  help decrease pain and muscle spasms  ¨ Opioids: This is a strong medicine given to reduce severe pain  It is also called narcotic pain medicine  Take this medicine exactly as directed by your healthcare provider  ¨ Oral steroids: Steroids may be given to reduce swelling and pain  ¨ Steroid injections: Healthcare providers may give you steroid medicine through a needle (shot) into your lumbar spine   This may help decrease your nerve pain and swelling  You may need more than 1 injection if your symptoms do not improve after the first treatment  · Physical therapy: Your healthcare provider may suggest physical therapy  Your physical therapist may teach you certain exercises to improve posture (the way you stand and sit), flexibility, and strength in your lower back  He may also teach you how to remain safely active and avoid further injury  Follow the exercise plan given to you by your physical therapist     · Transcutaneous electrical nerve stimulation: This treatment, called TENS, stimulates your nerves and may decrease your pain  Wires are attached to pads  The pads are attached to your skin  The wires send a mild current through your nerves  · Surgery: You may need surgery to relieve your pinched nerve if your condition has not improved within 4 to 6 weeks  You may also need it if you have lumbar radiculopathy more than once  What are the risks of treatment for lumbar radiculopathy? · Without treatment, your pain may worsen  The pinched and swollen nerve may lead to problems when you walk or move  In severe cases, you may lose control of your urine or bowel movements  Bedrest can make your symptoms worse  · Pain medicines can cause vomiting, upset stomach, constipation (large, hard bowel movements that are difficult to pass), or kidney or liver problems  Opioid medicine may be addictive (hard to quit taking once you start)  Oral steroids and steroid injections may have side effects, such as facial redness, fluid retention (water weight gain), and mood changes  Steroid injections may be painful and can cause severe headaches, infections, allergic reactions, or nerve damage  Surgery risks include delayed problems with healing, spinal or bladder infection, damage to the spinal cord or other nerves, and ongoing pain  In rare cases, you could become paralyzed (not able to move your arms or legs)    How can I care for myself when I have lumbar radiculopathy? · Stay active: It is best to be active when you have lumbar radiculopathy  Your healthcare provider may tell you to take walks to ease yourself back into your daily routine  Avoid long periods of bed rest  Bed rest could worsen your symptoms  Do not move in ways that increase your pain  Ask your healthcare provider for more information about the best ways to stay active  · Use ice or heat packs:  Use ice or heat packs on the sore area of your body to decrease the pain and swelling  Put ice in a plastic bag covered with a towel on your low back  Cover heated items with a towel to avoid burns  Use ice and heat as directed  · Avoid heavy lifting: Your condition may worsen if you lift heavy things  Avoid lifting if possible  · Maintain a healthy weight:  Excess body weight may strain your back  Talk with your healthcare provider about ways to lose excess weight if you are overweight  When should I contact my healthcare provider? · Your pain does not improve within 1 to 3 weeks after treatment  · Your pain and weakness keep you from your normal activities at work, home, or school  · You lose more than 10 pounds in 6 months without trying  · You become depressed or sad because of the pain  · You have questions or concerns about your condition or care  When should I seek immediate care or call 911? · You have a fever greater than 100 4°F for longer than 2 days  · You have new, severe back or leg pain, or your pain spreads to both legs  · You have any new signs of numbness or weakness, especially in your lower back, legs, arms, or genital area  · You have new trouble controlling your urine and bowel movements  · You do not feel like your bladder empties when you urinate  CARE AGREEMENT:   You have the right to help plan your care  Learn about your health condition and how it may be treated   Discuss treatment options with your caregivers to decide what care you want to receive  You always have the right to refuse treatment  The above information is an  only  It is not intended as medical advice for individual conditions or treatments  Talk to your doctor, nurse or pharmacist before following any medical regimen to see if it is safe and effective for you  © 2017 [facility] Information is for End User's use only and may not be sold, redistributed or otherwise used for commercial purposes  All illustrations and images included in CareNotes® are the copyrighted property of [employer]  or [employer]

## 2020-07-14 NOTE — PROGRESS NOTES
Assessment  1  Spinal stenosis of lumbar region with neurogenic claudication    2  Lumbar radiculopathy        Plan  If he benefit from previous epidural steroid injections pain is more right-sided does have foraminal stenosis will proceed with the interlaminar epidural steroid injection directly address any inflammatory component of the patient's pain  Will need to hold off on some time secondary to the steroids by however may consider diagnostic medial branch blocks and will re-evaluate after the procedure  My impressions and treatment recommendations were discussed in detail with the patient who verbalized understanding and had no further questions  Discharge instructions were provided  I personally saw and examined the patient and I agree with the above discussed plan of care  This note is created using dictation transcription  It may contain typographical errors, grammatical errors, improperly dictated words, background noise and other errors  Orders Placed This Encounter   Procedures    [facility] spine and pain procedure     Standing Status:   Future     Standing Expiration Date:   7/14/2024     Order Specific Question:   Reason for Exam:     Answer:   LESI to the right     Order Specific Question:   Anticoagulant hold needed? Answer:   no     No orders of the defined types were placed in this encounter  History of Present Illness    Krystian Robledo is a 75 y o  male who was last seen in February was treated with a lumbar epidural steroid injection to address his left-sided low back and leg pain  This has resolved in the interim he has retired as pain is more manageable but been developing right-sided low back and leg pain which interferes with daily living activities  Describes as worse in the evening and occasional as an achy sensation and cramping  He denies any bowel or bladder difficulty any recent fever chills respirations      I have personally reviewed and/or updated the patient's past medical history, past surgical history, family history, social history, current medications, allergies, and vital signs today  Review of Systems   Constitutional: Negative for fever and unexpected weight change  HENT: Negative for trouble swallowing  Eyes: Negative for visual disturbance  Respiratory: Negative for shortness of breath  Cardiovascular: Negative for chest pain  Gastrointestinal: Negative for diarrhea, nausea and vomiting  Musculoskeletal: Positive for gait problem and joint swelling  Negative for arthralgias and myalgias  Skin: Negative for rash  Neurological: Negative for dizziness, seizures and weakness  Patient Active Problem List   Diagnosis    Intervertebral disc disorder with radiculopathy of lumbosacral region    Left hip pain    Left low back pain    Lumbar foraminal stenosis    Spinal stenosis of lumbosacral region    Lumbar disc herniation    Lumbar radiculopathy    Chronic pain syndrome       Past Medical History:   Diagnosis Date    Arthritis     Cardiac disease     GERD (gastroesophageal reflux disease)     Hypertension     Sleep apnea        Past Surgical History:   Procedure Laterality Date    CARDIAC SURGERY      CABG and 5 stents    JOINT REPLACEMENT         History reviewed  No pertinent family history      Social History     Occupational History    Not on file   Tobacco Use    Smoking status: Never Smoker    Smokeless tobacco: Never Used   Substance and Sexual Activity    Alcohol use: No    Drug use: No    Sexual activity: Not on file       Current Outpatient Medications on File Prior to Visit   Medication Sig    amLODIPine (NORVASC) 5 mg tablet Take 5 mg by mouth daily    aspirin (ECOTRIN LOW STRENGTH) 81 mg EC tablet Take 81 mg by mouth daily    atorvastatin (LIPITOR) 20 mg tablet Take 20 mg by mouth daily    Biotin 77363 MCG TABS Take 5,000 mcg by mouth daily    cyanocobalamin (VITAMIN B-12) 1,000 mcg tablet Take 5,000 mcg by mouth daily    lisinopril (ZESTRIL) 20 mg tablet Take 20 mg by mouth 2 (two) times a day    Magnesium 400 MG CAPS     metoprolol succinate (TOPROL-XL) 25 mg 24 hr tablet Take 25 mg by mouth daily    Misc Natural Products (PROSTATE HEALTH PO) every 24 hours    omeprazole (PriLOSEC) 20 mg delayed release capsule Take 20 mg by mouth daily    Pediatric Multivitamins-Fl (MULTIVITAMINS/FL PO) every 24 hours    saccharomyces boulardii (FLORASTOR) 250 mg capsule Take 250 mg by mouth 2 (two) times a day    Thiamine HCl (VITAMIN B-1) 250 MG tablet Take 250 mg by mouth daily    Thiamine HCl (VITAMIN B-1) 250 MG tablet every 24 hours     No current facility-administered medications on file prior to visit  No Known Allergies    Physical Exam    /70   Temp 98 6 °F (37 °C)   Ht 5' 10" (1 778 m)   Wt 106 kg (234 lb)   BMI 33 58 kg/m²     Constitutional: normal, well developed, well nourished, alert, in no distress and non-toxic and no overt pain behavior    Eyes: anicteric  HEENT: grossly intact  Neck: supple, symmetric, trachea midline and no masses   Pulmonary:even and unlabored  Cardiovascular:No edema or pitting edema present  Skin:Normal without rashes or lesions and well hydrated  Psychiatric:Mood and affect appropriate  Neurologic:Cranial Nerves II-XII grossly intact  Musculoskeletal:normal

## 2020-07-21 ENCOUNTER — HOSPITAL ENCOUNTER (OUTPATIENT)
Dept: RADIOLOGY | Facility: CLINIC | Age: 75
Discharge: HOME | End: 2020-07-21
Attending: ANESTHESIOLOGY
Payer: MEDICARE

## 2020-07-21 VITALS
DIASTOLIC BLOOD PRESSURE: 74 MMHG | TEMPERATURE: 98.2 F | HEART RATE: 58 BPM | RESPIRATION RATE: 18 BRPM | SYSTOLIC BLOOD PRESSURE: 146 MMHG | OXYGEN SATURATION: 96 %

## 2020-07-21 DIAGNOSIS — M54.16 LUMBAR RADICULOPATHY: ICD-10-CM

## 2020-07-21 DIAGNOSIS — M48.062 SPINAL STENOSIS OF LUMBAR REGION WITH NEUROGENIC CLAUDICATION: ICD-10-CM

## 2020-07-21 PROCEDURE — 62323 NJX INTERLAMINAR LMBR/SAC: CPT | Performed by: ANESTHESIOLOGY

## 2020-07-21 RX ORDER — LIDOCAINE HYDROCHLORIDE 10 MG/ML
5 INJECTION, SOLUTION EPIDURAL; INFILTRATION; INTRACAUDAL; PERINEURAL ONCE
Status: COMPLETED | OUTPATIENT
Start: 2020-07-21 | End: 2020-07-21

## 2020-07-21 RX ORDER — METHYLPREDNISOLONE ACETATE 80 MG/ML
160 INJECTION, SUSPENSION INTRA-ARTICULAR; INTRALESIONAL; INTRAMUSCULAR; PARENTERAL; SOFT TISSUE ONCE
Status: COMPLETED | OUTPATIENT
Start: 2020-07-21 | End: 2020-07-21

## 2020-07-21 RX ADMIN — LIDOCAINE HYDROCHLORIDE 3 ML: 10 INJECTION, SOLUTION EPIDURAL; INFILTRATION; INTRACAUDAL; PERINEURAL at 14:23

## 2020-07-21 RX ADMIN — IOHEXOL 1 ML: 300 INJECTION, SOLUTION INTRAVENOUS at 14:23

## 2020-07-21 RX ADMIN — METHYLPREDNISOLONE ACETATE 160 MG: 80 INJECTION, SUSPENSION INTRA-ARTICULAR; INTRALESIONAL; INTRAMUSCULAR; SOFT TISSUE at 14:23

## 2020-07-21 NOTE — DISCHARGE INSTR - LAB
Epidural Steroid Injection   WHAT YOU NEED TO KNOW:   An epidural steroid injection (MARTINEZ) is a procedure to inject steroid medicine into the epidural space  The epidural space is between your spinal cord and vertebrae  Steroids reduce inflammation and fluid buildup in your spine that may be causing pain  You may be given pain medicine along with the steroids  ACTIVITY  · Do not drive or operate machinery today  · No strenuous activity today - bending, lifting, etc   · You may resume normal activites starting tomorrow - start slowly and as tolerated  · You may shower today, but no tub baths or hot tubs  · You may have numbness for several hours from the local anesthetic  Please use caution and common sense, especially with weight-bearing activities  CARE OF THE INJECTION SITE  · If you have soreness or pain, apply ice to the area today (20 minutes on/20 minutes off)  · Starting tomorrow, you may use warm, moist heat or ice if needed  · You may have an increase or change in your discomfort for 36-48 hours after your treatment  · Apply ice and continue with any pain medication you have been prescribed  · [facility] if you have any of the following: redness, drainage, swelling, headache, stiff neck or fever above 100°F     SPECIAL INSTRUCTIONS  · Our office will contact you in approximately 7 days for a progress report  MEDICATIONS  · Continue to take all routine medications  · Our office may have instructed you to hold some medications  If you have a problem specifically related to your procedure, please call our office at [phone]  Problems not related to your procedure should be directed to your primary care physician

## 2020-07-21 NOTE — H&P
History of Present Illness: The patient is a 75 y o  male who presents with complaints of low back and leg pain      Patient Active Problem List   Diagnosis    Intervertebral disc disorder with radiculopathy of lumbosacral region    Left hip pain    Left low back pain    Lumbar foraminal stenosis    Spinal stenosis of lumbosacral region    Lumbar disc herniation    Lumbar radiculopathy    Chronic pain syndrome    Spinal stenosis of lumbar region with neurogenic claudication       Past Medical History:   Diagnosis Date    Arthritis     Cardiac disease     GERD (gastroesophageal reflux disease)     Hypertension     Sleep apnea        Past Surgical History:   Procedure Laterality Date    CARDIAC SURGERY      CABG and 5 stents    JOINT REPLACEMENT           Current Outpatient Medications:     amLODIPine (NORVASC) 5 mg tablet, Take 5 mg by mouth daily, Disp: , Rfl:     aspirin (ECOTRIN LOW STRENGTH) 81 mg EC tablet, Take 81 mg by mouth daily, Disp: , Rfl:     atorvastatin (LIPITOR) 20 mg tablet, Take 20 mg by mouth daily, Disp: , Rfl:     Biotin 02123 MCG TABS, Take 5,000 mcg by mouth daily, Disp: , Rfl:     cyanocobalamin (VITAMIN B-12) 1,000 mcg tablet, Take 5,000 mcg by mouth daily, Disp: , Rfl:     lisinopril (ZESTRIL) 20 mg tablet, Take 20 mg by mouth 2 (two) times a day, Disp: , Rfl:     Magnesium 400 MG CAPS, , Disp: , Rfl:     metoprolol succinate (TOPROL-XL) 25 mg 24 hr tablet, Take 25 mg by mouth daily, Disp: , Rfl:     Misc Natural Products (PROSTATE HEALTH PO), every 24 hours, Disp: , Rfl:     omeprazole (PriLOSEC) 20 mg delayed release capsule, Take 20 mg by mouth daily, Disp: , Rfl:     Pediatric Multivitamins-Fl (MULTIVITAMINS/FL PO), every 24 hours, Disp: , Rfl:     saccharomyces boulardii (FLORASTOR) 250 mg capsule, Take 250 mg by mouth 2 (two) times a day, Disp: , Rfl:     Thiamine HCl (VITAMIN B-1) 250 MG tablet, Take 250 mg by mouth daily, Disp: , Rfl:     Thiamine HCl (VITAMIN B-1) 250 MG tablet, every 24 hours, Disp: , Rfl:     Current Facility-Administered Medications:     iohexol (OMNIPAQUE) 300 mg/mL injection 50 mL, 50 mL, Epidural, Once, Adria Encinas DO    lidocaine (PF) (XYLOCAINE-MPF) 1 % injection 5 mL, 5 mL, Other, Once, Adria Encinas DO    methylPREDNISolone acetate (DEPO-MEDROL) injection 160 mg, 160 mg, Epidural, Once, Adria Encinas DO    No Known Allergies    Physical Exam:   General: Awake, Alert, Oriented x 3, Mood and affect appropriate  Respiratory: Respirations even and unlabored  Cardiovascular: Peripheral pulses intact; no edema  Musculoskeletal Exam:  Decreased range of motion lumbar spine    ASA Score: II         Assessment:   1  Spinal stenosis of lumbar region with neurogenic claudication    2   Lumbar radiculopathy        Plan: LESI to the right

## 2020-07-28 ENCOUNTER — TELEPHONE (OUTPATIENT)
Dept: PAIN MEDICINE | Facility: CLINIC | Age: 75
End: 2020-07-28

## 2020-08-11 ENCOUNTER — OFFICE VISIT (OUTPATIENT)
Dept: PAIN MEDICINE | Facility: CLINIC | Age: 75
End: 2020-08-11
Payer: MEDICARE

## 2020-08-11 VITALS
WEIGHT: 230 LBS | DIASTOLIC BLOOD PRESSURE: 78 MMHG | HEIGHT: 70 IN | BODY MASS INDEX: 32.93 KG/M2 | HEART RATE: 56 BPM | TEMPERATURE: 97.9 F | SYSTOLIC BLOOD PRESSURE: 132 MMHG

## 2020-08-11 DIAGNOSIS — G89.29 CHRONIC LEFT-SIDED LOW BACK PAIN WITHOUT SCIATICA: ICD-10-CM

## 2020-08-11 DIAGNOSIS — G89.4 CHRONIC PAIN SYNDROME: Primary | ICD-10-CM

## 2020-08-11 DIAGNOSIS — M54.50 CHRONIC LEFT-SIDED LOW BACK PAIN WITHOUT SCIATICA: ICD-10-CM

## 2020-08-11 DIAGNOSIS — M51.26 LUMBAR DISC HERNIATION: ICD-10-CM

## 2020-08-11 DIAGNOSIS — M25.552 LEFT HIP PAIN: ICD-10-CM

## 2020-08-11 DIAGNOSIS — M48.062 SPINAL STENOSIS OF LUMBAR REGION WITH NEUROGENIC CLAUDICATION: ICD-10-CM

## 2020-08-11 DIAGNOSIS — M48.061 LUMBAR FORAMINAL STENOSIS: ICD-10-CM

## 2020-08-11 DIAGNOSIS — M54.16 LUMBAR RADICULOPATHY: ICD-10-CM

## 2020-08-11 PROCEDURE — 99213 OFFICE O/P EST LOW 20 MIN: CPT | Performed by: NURSE PRACTITIONER

## 2020-08-11 NOTE — PROGRESS NOTES
Assessment:  1  Chronic pain syndrome    2  Chronic left-sided low back pain without sciatica    3  Left hip pain    4  Lumbar radiculopathy    5  Lumbar foraminal stenosis    6  Lumbar disc herniation    7  Spinal stenosis of lumbar region with neurogenic claudication        Plan:  I discussed with the patient that since there has been moderate-significant improvement in the pain symptoms that we will hold off on any repeat injections at this point in time  However, I did explain that if over the next 8-12 weeks the pain symptoms should return, worsen, and/or change, we could consider a repeat injection  If after the 12 weeks and OV would be warranted for re-evaluation  The patient was agreeable and verbalized an understanding  I discussed with the patient that at this point time he can followup with our office on an as-needed basis  I did review the patient that if his pain symptoms should change, worsen, and/or if he would experience any new symptoms he would like to be evaluated for, he should give our office a call  The patient was agreeable and verbalized an understanding  History of Present Illness: The patient is a 75 y o  male last seen on 7/21/2020 who presents for a follow up office visit in regards to chronic pain syndrome secondary to lumbar stenosis with a disc herniation and radiculopathy  The patient currently reports that since his last office visit as he is status post his 3rd L5-S1 interlaminar lumbar epidural steroid injection directed towards the right side on July 21, 2020 that there is at least 80% overall improvement and feels that this injection was the most helpful for the right-sided low back and leg pain  The patient reports that he feels he is able to do his normal activities of daily living as well as leisure activities with very manageable and tolerable pain      I have personally reviewed and/or updated the patient's past medical history, past surgical history, family history, social history, current medications, allergies, and vital signs today  Review of Systems:    Review of Systems   Respiratory: Negative for shortness of breath  Cardiovascular: Negative for chest pain  Gastrointestinal: Negative for constipation, diarrhea, nausea and vomiting  Musculoskeletal: Negative for arthralgias, gait problem, joint swelling (joint stiffnes) and myalgias  Skin: Negative for rash  Neurological: Negative for dizziness, seizures and weakness  All other systems reviewed and are negative  Past Medical History:   Diagnosis Date    Arthritis     Cardiac disease     GERD (gastroesophageal reflux disease)     Hypertension     Sleep apnea        Past Surgical History:   Procedure Laterality Date    CARDIAC SURGERY      CABG and 5 stents    JOINT REPLACEMENT         No family history on file      Social History     Occupational History    Not on file   Tobacco Use    Smoking status: Never Smoker    Smokeless tobacco: Never Used   Substance and Sexual Activity    Alcohol use: No    Drug use: No    Sexual activity: Not on file         Current Outpatient Medications:     amLODIPine (NORVASC) 5 mg tablet, Take 5 mg by mouth daily, Disp: , Rfl:     aspirin (ECOTRIN LOW STRENGTH) 81 mg EC tablet, Take 81 mg by mouth daily, Disp: , Rfl:     atorvastatin (LIPITOR) 20 mg tablet, Take 20 mg by mouth daily, Disp: , Rfl:     Biotin 43546 MCG TABS, Take 5,000 mcg by mouth daily, Disp: , Rfl:     cyanocobalamin (VITAMIN B-12) 1,000 mcg tablet, Take 5,000 mcg by mouth daily, Disp: , Rfl:     lisinopril (ZESTRIL) 20 mg tablet, Take 20 mg by mouth 2 (two) times a day, Disp: , Rfl:     Magnesium 400 MG CAPS, , Disp: , Rfl:     metoprolol succinate (TOPROL-XL) 25 mg 24 hr tablet, Take 25 mg by mouth daily, Disp: , Rfl:     Misc Natural Products (PROSTATE HEALTH PO), every 24 hours, Disp: , Rfl:     omeprazole (PriLOSEC) 20 mg delayed release capsule, Take 20 mg by mouth daily, Disp: , Rfl:     Pediatric Multivitamins-Fl (MULTIVITAMINS/FL PO), every 24 hours, Disp: , Rfl:     saccharomyces boulardii (FLORASTOR) 250 mg capsule, Take 250 mg by mouth 2 (two) times a day, Disp: , Rfl:     Thiamine HCl (VITAMIN B-1) 250 MG tablet, Take 250 mg by mouth daily, Disp: , Rfl:     Thiamine HCl (VITAMIN B-1) 250 MG tablet, every 24 hours, Disp: , Rfl:     No Known Allergies    Physical Exam:    There were no vitals taken for this visit  Constitutional:normal, well developed, well nourished, alert, in no distress and non-toxic and no overt pain behavior  and overweight  Eyes:anicteric  HEENT:grossly intact  Neck:supple, symmetric, trachea midline and no masses   Pulmonary:even and unlabored  Cardiovascular:No edema or pitting edema present  Skin:Normal without rashes or lesions and well hydrated  Psychiatric:Mood and affect appropriate  Neurologic:Cranial Nerves II-XII grossly intact  Musculoskeletal:normal      Imaging  No orders to display         No orders of the defined types were placed in this encounter

## 2021-10-18 ENCOUNTER — TELEPHONE (OUTPATIENT)
Dept: PAIN MEDICINE | Facility: CLINIC | Age: 76
End: 2021-10-18

## 2021-10-18 DIAGNOSIS — M54.50 CHRONIC LEFT-SIDED LOW BACK PAIN WITHOUT SCIATICA: Primary | ICD-10-CM

## 2021-10-18 DIAGNOSIS — G89.29 CHRONIC LEFT-SIDED LOW BACK PAIN WITHOUT SCIATICA: Primary | ICD-10-CM

## 2021-10-19 ENCOUNTER — APPOINTMENT (OUTPATIENT)
Dept: RADIOLOGY | Facility: CLINIC | Age: 76
End: 2021-10-19
Payer: MEDICARE

## 2021-10-19 DIAGNOSIS — G89.29 CHRONIC LEFT-SIDED LOW BACK PAIN WITHOUT SCIATICA: ICD-10-CM

## 2021-10-19 DIAGNOSIS — M54.50 CHRONIC LEFT-SIDED LOW BACK PAIN WITHOUT SCIATICA: ICD-10-CM

## 2021-10-19 DIAGNOSIS — M54.50 CHRONIC RIGHT-SIDED LOW BACK PAIN WITHOUT SCIATICA: ICD-10-CM

## 2021-10-19 DIAGNOSIS — G89.29 CHRONIC RIGHT-SIDED LOW BACK PAIN WITHOUT SCIATICA: ICD-10-CM

## 2021-10-19 PROCEDURE — 72114 X-RAY EXAM L-S SPINE BENDING: CPT

## 2021-10-25 ENCOUNTER — OFFICE VISIT (OUTPATIENT)
Dept: PAIN MEDICINE | Facility: CLINIC | Age: 76
End: 2021-10-25
Payer: MEDICARE

## 2021-10-25 VITALS
HEART RATE: 72 BPM | SYSTOLIC BLOOD PRESSURE: 146 MMHG | HEIGHT: 70 IN | BODY MASS INDEX: 31.64 KG/M2 | WEIGHT: 221 LBS | TEMPERATURE: 97.8 F | DIASTOLIC BLOOD PRESSURE: 88 MMHG

## 2021-10-25 DIAGNOSIS — M48.062 SPINAL STENOSIS OF LUMBAR REGION WITH NEUROGENIC CLAUDICATION: ICD-10-CM

## 2021-10-25 DIAGNOSIS — M51.17 INTERVERTEBRAL DISC DISORDER WITH RADICULOPATHY OF LUMBOSACRAL REGION: ICD-10-CM

## 2021-10-25 DIAGNOSIS — M51.26 LUMBAR DISC HERNIATION: ICD-10-CM

## 2021-10-25 DIAGNOSIS — M54.16 LUMBAR RADICULOPATHY: ICD-10-CM

## 2021-10-25 DIAGNOSIS — G89.4 CHRONIC PAIN SYNDROME: Primary | ICD-10-CM

## 2021-10-25 DIAGNOSIS — M48.061 LUMBAR FORAMINAL STENOSIS: ICD-10-CM

## 2021-10-25 DIAGNOSIS — M54.50 CHRONIC LEFT-SIDED LOW BACK PAIN WITHOUT SCIATICA: ICD-10-CM

## 2021-10-25 DIAGNOSIS — M25.552 LEFT HIP PAIN: ICD-10-CM

## 2021-10-25 DIAGNOSIS — G89.29 CHRONIC LEFT-SIDED LOW BACK PAIN WITHOUT SCIATICA: ICD-10-CM

## 2021-10-25 PROBLEM — M48.07 SPINAL STENOSIS OF LUMBOSACRAL REGION: Status: RESOLVED | Noted: 2017-01-25 | Resolved: 2021-10-25

## 2021-10-25 PROCEDURE — 99214 OFFICE O/P EST MOD 30 MIN: CPT | Performed by: NURSE PRACTITIONER

## 2021-10-25 RX ORDER — IBUPROFEN 200 MG
TABLET ORAL EVERY 6 HOURS PRN
COMMUNITY

## 2021-11-01 ENCOUNTER — HOSPITAL ENCOUNTER (OUTPATIENT)
Dept: RADIOLOGY | Facility: CLINIC | Age: 76
Discharge: HOME | End: 2021-11-01
Attending: ANESTHESIOLOGY | Admitting: ANESTHESIOLOGY
Payer: MEDICARE

## 2021-11-01 VITALS
RESPIRATION RATE: 20 BRPM | HEART RATE: 56 BPM | DIASTOLIC BLOOD PRESSURE: 75 MMHG | SYSTOLIC BLOOD PRESSURE: 163 MMHG | TEMPERATURE: 97.3 F | OXYGEN SATURATION: 97 %

## 2021-11-01 DIAGNOSIS — M48.061 LUMBAR FORAMINAL STENOSIS: ICD-10-CM

## 2021-11-01 DIAGNOSIS — M48.062 SPINAL STENOSIS OF LUMBAR REGION WITH NEUROGENIC CLAUDICATION: ICD-10-CM

## 2021-11-01 DIAGNOSIS — M51.26 LUMBAR DISC HERNIATION: ICD-10-CM

## 2021-11-01 DIAGNOSIS — M54.16 LUMBAR RADICULOPATHY: ICD-10-CM

## 2021-11-01 PROCEDURE — 62323 NJX INTERLAMINAR LMBR/SAC: CPT | Performed by: ANESTHESIOLOGY

## 2021-11-01 RX ORDER — METHYLPREDNISOLONE ACETATE 80 MG/ML
80 INJECTION, SUSPENSION INTRA-ARTICULAR; INTRALESIONAL; INTRAMUSCULAR; PARENTERAL; SOFT TISSUE ONCE
Status: COMPLETED | OUTPATIENT
Start: 2021-11-01 | End: 2021-11-01

## 2021-11-01 RX ADMIN — METHYLPREDNISOLONE ACETATE 80 MG: 80 INJECTION, SUSPENSION INTRA-ARTICULAR; INTRALESIONAL; INTRAMUSCULAR; SOFT TISSUE at 10:43

## 2021-11-01 RX ADMIN — IOHEXOL 1.5 ML: 300 INJECTION, SOLUTION INTRAVENOUS at 10:43

## 2021-11-08 ENCOUNTER — TELEPHONE (OUTPATIENT)
Dept: PAIN MEDICINE | Facility: CLINIC | Age: 76
End: 2021-11-08

## 2021-11-08 NOTE — TELEPHONE ENCOUNTER
1st attempt  Lm to cb with % improvement and pain level.     S/p L5-S1 LESI to the Right 11/1, 11/15 LESI #2

## 2021-11-15 ENCOUNTER — HOSPITAL ENCOUNTER (OUTPATIENT)
Dept: RADIOLOGY | Facility: CLINIC | Age: 76
Discharge: HOME | End: 2021-11-15
Attending: ANESTHESIOLOGY | Admitting: ANESTHESIOLOGY
Payer: MEDICARE

## 2021-11-15 VITALS
HEART RATE: 57 BPM | SYSTOLIC BLOOD PRESSURE: 158 MMHG | OXYGEN SATURATION: 95 % | RESPIRATION RATE: 20 BRPM | TEMPERATURE: 97.5 F | DIASTOLIC BLOOD PRESSURE: 80 MMHG

## 2021-11-15 DIAGNOSIS — M51.26 LUMBAR DISC HERNIATION: ICD-10-CM

## 2021-11-15 DIAGNOSIS — M48.061 LUMBAR FORAMINAL STENOSIS: ICD-10-CM

## 2021-11-15 DIAGNOSIS — M54.16 LUMBAR RADICULOPATHY: ICD-10-CM

## 2021-11-15 DIAGNOSIS — M48.062 SPINAL STENOSIS OF LUMBAR REGION WITH NEUROGENIC CLAUDICATION: ICD-10-CM

## 2021-11-15 PROCEDURE — 62323 NJX INTERLAMINAR LMBR/SAC: CPT | Performed by: ANESTHESIOLOGY

## 2021-11-15 RX ORDER — METHYLPREDNISOLONE ACETATE 80 MG/ML
80 INJECTION, SUSPENSION INTRA-ARTICULAR; INTRALESIONAL; INTRAMUSCULAR; PARENTERAL; SOFT TISSUE ONCE
Status: COMPLETED | OUTPATIENT
Start: 2021-11-15 | End: 2021-11-15

## 2021-11-15 RX ADMIN — METHYLPREDNISOLONE ACETATE 80 MG: 80 INJECTION, SUSPENSION INTRA-ARTICULAR; INTRALESIONAL; INTRAMUSCULAR; SOFT TISSUE at 09:20

## 2021-11-15 RX ADMIN — IOHEXOL 1 ML: 300 INJECTION, SOLUTION INTRAVENOUS at 09:20

## 2021-11-22 ENCOUNTER — TELEPHONE (OUTPATIENT)
Dept: PAIN MEDICINE | Facility: CLINIC | Age: 76
End: 2021-11-22

## 2021-11-22 NOTE — TELEPHONE ENCOUNTER
Pt returned call in with  70-75 % improvement and pain level is a 0/10.      Please be advised thank you.  Pt can be reached @ [phone]

## 2021-12-15 ENCOUNTER — OFFICE VISIT (OUTPATIENT)
Dept: PAIN MEDICINE | Facility: CLINIC | Age: 76
End: 2021-12-15
Payer: MEDICARE

## 2021-12-15 VITALS
BODY MASS INDEX: 32.07 KG/M2 | TEMPERATURE: 97.8 F | DIASTOLIC BLOOD PRESSURE: 70 MMHG | WEIGHT: 224 LBS | HEIGHT: 70 IN | SYSTOLIC BLOOD PRESSURE: 142 MMHG | HEART RATE: 59 BPM

## 2021-12-15 DIAGNOSIS — M51.26 LUMBAR DISC HERNIATION: ICD-10-CM

## 2021-12-15 DIAGNOSIS — M54.50 CHRONIC BILATERAL LOW BACK PAIN WITHOUT SCIATICA: ICD-10-CM

## 2021-12-15 DIAGNOSIS — G89.4 CHRONIC PAIN SYNDROME: Primary | ICD-10-CM

## 2021-12-15 DIAGNOSIS — M51.17 INTERVERTEBRAL DISC DISORDER WITH RADICULOPATHY OF LUMBOSACRAL REGION: ICD-10-CM

## 2021-12-15 DIAGNOSIS — M48.061 LUMBAR FORAMINAL STENOSIS: ICD-10-CM

## 2021-12-15 DIAGNOSIS — M48.062 SPINAL STENOSIS OF LUMBAR REGION WITH NEUROGENIC CLAUDICATION: ICD-10-CM

## 2021-12-15 DIAGNOSIS — G89.29 CHRONIC BILATERAL LOW BACK PAIN WITHOUT SCIATICA: ICD-10-CM

## 2021-12-15 DIAGNOSIS — M54.16 LUMBAR RADICULOPATHY: ICD-10-CM

## 2021-12-15 PROCEDURE — 99213 OFFICE O/P EST LOW 20 MIN: CPT | Performed by: NURSE PRACTITIONER

## 2023-02-09 ENCOUNTER — HOSPITAL ENCOUNTER (OUTPATIENT)
Dept: RADIOLOGY | Facility: HOSPITAL | Age: 78
Discharge: HOME | End: 2023-02-09

## 2023-02-09 DIAGNOSIS — M72.2 PLANTAR FASCIAL FIBROMATOSIS: ICD-10-CM

## 2023-02-09 DIAGNOSIS — M79.671 RIGHT FOOT PAIN: ICD-10-CM

## 2023-04-24 ENCOUNTER — OFFICE VISIT (OUTPATIENT)
Dept: PAIN MEDICINE | Facility: CLINIC | Age: 78
End: 2023-04-24

## 2023-04-24 VITALS
HEIGHT: 70 IN | TEMPERATURE: 98.2 F | SYSTOLIC BLOOD PRESSURE: 135 MMHG | WEIGHT: 220 LBS | DIASTOLIC BLOOD PRESSURE: 80 MMHG | BODY MASS INDEX: 31.5 KG/M2

## 2023-04-24 DIAGNOSIS — M51.36 DISC DEGENERATION, LUMBAR: ICD-10-CM

## 2023-04-24 DIAGNOSIS — M48.061 FORAMINAL STENOSIS OF LUMBAR REGION: ICD-10-CM

## 2023-04-24 DIAGNOSIS — G89.29 CHRONIC BILATERAL LOW BACK PAIN, UNSPECIFIED WHETHER SCIATICA PRESENT: ICD-10-CM

## 2023-04-24 DIAGNOSIS — G89.4 CHRONIC PAIN SYNDROME: ICD-10-CM

## 2023-04-24 DIAGNOSIS — M79.18 MYOFASCIAL PAIN SYNDROME: ICD-10-CM

## 2023-04-24 DIAGNOSIS — M54.50 CHRONIC BILATERAL LOW BACK PAIN, UNSPECIFIED WHETHER SCIATICA PRESENT: ICD-10-CM

## 2023-04-24 RX ORDER — METHOCARBAMOL 500 MG/1
TABLET, FILM COATED ORAL
Qty: 30 TABLET | Refills: 0 | Status: SHIPPED | OUTPATIENT
Start: 2023-04-24

## 2023-04-24 RX ORDER — VIT C/B6/B5/MAGNESIUM/HERB 173 50-5-6-5MG
1000 CAPSULE ORAL DAILY
COMMUNITY

## 2023-04-24 NOTE — PROGRESS NOTES
Assessment:  1  Disc degeneration, lumbar    2  Foraminal stenosis of lumbar region    3  Chronic pain syndrome    4  Chronic bilateral low back pain, unspecified whether sciatica present        Plan:  The patient is a 65 y o  male last seen on 12/15/2022 who presents for a follow up office visit in regards to chronic pain secondary to low back pain, lumbar intervertebral disc disorder, and lumbar foraminal stenosis  Patient presents today with worsening left-sided back pain which resulted after a fall about 1 month ago  MRI of the lumbar spine from 2016 shows multilevel degenerative disc disease along with foraminal stenosis L4-L5 L5-S1  To help decrease inflammation and pain, a lumbar translaminar epidural steroid injection at L5-S1 directed to the left can be performed  Patient had underwent epidural steroid injections in 2021 which had provided pain relief until 1 month ago when he fell  Complete risks and benefits including bleeding, infection, tissue reaction, nerve injury and allergic reaction were discussed  The approach was demonstrated using models and literature was provided  Verbal and written consent was obtained  If he fails to obtain relief for the epidural steroid injection, I would order an MRI lumbar spine  Last imaging was from 2016  For the muscle spasms, I will send a prescription for methocarbamol 500 mg 1 tablet at bedtime  Side effects include drowsiness and dizziness  Prescription was sent to the pharmacy    The patient will follow-up in 4 weeks post injection for medication prescription refill and reevaluation  The patient was advised to contact the office should their symptoms worsen in the interim  The patient was agreeable and verbalized an understanding  History of Present Illness:     The patient is a 65 y o  male last seen on 12/15/2022 who presents for a follow up office visit in regards to chronic pain secondary to low back pain, lumbar intervertebral disc disorder, and lumbar foraminal stenosis  His last office visit was December 15, 2021, in which he had received 70% pain relief after undergoing a lumbar translaminar epidural steroid injection at L5-S1 in November 2021  Patient presents today with ongoing low back pain  The pain has increased since the last office visit  About 1 month ago, the patient was loading logs onto a trailer  The trailer moved causing him to twist and fall on his left side  The pain has not improved much since the fall  It is located on the left side of the low back and radiates up in the mid back and scapula on the left side  He denies radicular leg pain  The pain is constant but worse in the morning  He describes it as dull aching and shooting  He is rating his pain a 4/10 on the numeric rating scale    Last MRI imaging of the lumbar spine was from 2016  Showed multilevel degenerative disc disease along with left foraminal stenosis at L5-S1 and bilateral recess stenosis at L4-L5  I have personally reviewed and/or updated the patient's past medical history, past surgical history, family history, social history, current medications, allergies, and vital signs today  Review of Systems:    Review of Systems   Musculoskeletal: Positive for joint swelling (joint stiffness)  Decreased ROM   Neurological: Positive for weakness  Past Medical History:   Diagnosis Date   • Arthritis    • Cardiac disease    • GERD (gastroesophageal reflux disease)    • Hypertension    • Sleep apnea        Past Surgical History:   Procedure Laterality Date   • CARDIAC SURGERY      CABG and 5 stents   • JOINT REPLACEMENT         History reviewed  No pertinent family history      Social History     Occupational History   • Not on file   Tobacco Use   • Smoking status: Never   • Smokeless tobacco: Never   Substance and Sexual Activity   • Alcohol use: No   • Drug use: No   • Sexual activity: Not on file         Current Outpatient "Medications:   •  amLODIPine (NORVASC) 5 mg tablet, Take 5 mg by mouth daily  , Disp: , Rfl:   •  aspirin (ECOTRIN LOW STRENGTH) 81 mg EC tablet, Take 81 mg by mouth daily, Disp: , Rfl:   •  atorvastatin (LIPITOR) 20 mg tablet, Take 40 mg by mouth daily, Disp: , Rfl:   •  Biotin 25956 MCG TABS, Take 5,000 mcg by mouth daily, Disp: , Rfl:   •  cyanocobalamin (VITAMIN B-12) 1,000 mcg tablet, Take 5,000 mcg by mouth daily, Disp: , Rfl:   •  ibuprofen (MOTRIN) 200 mg tablet, Take by mouth every 6 (six) hours as needed for mild pain, Disp: , Rfl:   •  lisinopril (ZESTRIL) 20 mg tablet, Take 20 mg by mouth 2 (two) times a day, Disp: , Rfl:   •  Magnesium 400 MG CAPS, , Disp: , Rfl:   •  metoprolol succinate (TOPROL-XL) 25 mg 24 hr tablet, Take 25 mg by mouth daily, Disp: , Rfl:   •  Misc Natural Products (PROSTATE HEALTH PO), every 24 hours, Disp: , Rfl:   •  omeprazole (PriLOSEC) 20 mg delayed release capsule, Take 20 mg by mouth daily, Disp: , Rfl:   •  Pediatric Multivitamins-Fl (MULTIVITAMINS/FL PO), every 24 hours, Disp: , Rfl:   •  saccharomyces boulardii (FLORASTOR) 250 mg capsule, Take 250 mg by mouth 2 (two) times a day, Disp: , Rfl:   •  Thiamine HCl (VITAMIN B-1) 250 MG tablet, Take 250 mg by mouth daily, Disp: , Rfl:   •  Thiamine HCl (VITAMIN B-1) 250 MG tablet, every 24 hours, Disp: , Rfl:   •  Turmeric 500 MG CAPS, Take 1,000 mg by mouth daily, Disp: , Rfl:     No Known Allergies    Physical Exam:    /80 (BP Location: Right arm, Patient Position: Sitting, Cuff Size: Adult)   Temp 98 2 °F (36 8 °C)   Ht 5' 10\" (1 778 m) Comment: verbal  Wt 109 kg (240 lb)   BMI 34 44 kg/m²     Constitutional:normal, well developed, well nourished, alert, in no distress and non-toxic and no overt pain behavior    Eyes:anicteric  HEENT:grossly intact  Neck:supple, symmetric, trachea midline and no masses   Pulmonary:even and unlabored  Cardiovascular:No edema or pitting edema present  Skin:Normal without rashes or " lesions and well hydrated  Psychiatric:Mood and affect appropriate  Neurologic:Cranial Nerves II-XII grossly intact  Musculoskeletal:normal     Lumbar Spine Exam    Appearance:  Normal lordosis  Palpation/Tenderness:  left lumbar paraspinal tenderness  Range of Motion:  Flexion:  Minimally limited  without pain  Extension:  Minimally limited  without pain  Motor Strength:  Left hip flexion:  5/5  Right hip flexion:  5/5  Left knee flexion:  5/5  Left knee extension:  5/5  Right knee flexion:  5/5  Right knee extension:  5/5  Left foot dorsiflexion:  5/5  Left foot plantar flexion:  5/5  Right foot dorsiflexion:  5/5  Right foot plantar flexion:  5/5    Imaging      MRI:  Lumbar 12/28/16  :     Multilevel degenerative disc disease    Mild thecal sac stenosis across the L1-L2 and L2-L3 levels, as discussed above    Right lateral recess stenosis at the L3-L4 level and bilateral recess stenosis at the L4-L5 Level    Left neural foraminal stenosis at the L5-S1 level          FL spine and pain procedure    (Results Pending)         Orders Placed This Encounter   Procedures   • FL spine and pain procedure

## 2023-05-15 ENCOUNTER — HOSPITAL ENCOUNTER (OUTPATIENT)
Dept: RADIOLOGY | Facility: CLINIC | Age: 78
Discharge: HOME | End: 2023-05-15
Admitting: ANESTHESIOLOGY

## 2023-05-15 VITALS
TEMPERATURE: 97.3 F | DIASTOLIC BLOOD PRESSURE: 68 MMHG | SYSTOLIC BLOOD PRESSURE: 116 MMHG | HEART RATE: 63 BPM | OXYGEN SATURATION: 92 % | RESPIRATION RATE: 18 BRPM

## 2023-05-15 DIAGNOSIS — M48.061 FORAMINAL STENOSIS OF LUMBAR REGION: ICD-10-CM

## 2023-05-15 DIAGNOSIS — M51.36 DISC DEGENERATION, LUMBAR: ICD-10-CM

## 2023-05-15 RX ORDER — METHYLPREDNISOLONE ACETATE 80 MG/ML
80 INJECTION, SUSPENSION INTRA-ARTICULAR; INTRALESIONAL; INTRAMUSCULAR; PARENTERAL; SOFT TISSUE ONCE
Status: COMPLETED | OUTPATIENT
Start: 2023-05-15 | End: 2023-05-15

## 2023-05-15 RX ADMIN — METHYLPREDNISOLONE ACETATE 80 MG: 80 INJECTION, SUSPENSION INTRA-ARTICULAR; INTRALESIONAL; INTRAMUSCULAR; SOFT TISSUE at 15:11

## 2023-05-15 RX ADMIN — IOHEXOL 1 ML: 300 INJECTION, SOLUTION INTRAVENOUS at 15:11

## 2023-05-15 NOTE — H&P
History of Present Illness: The patient is a 65 y o  male who presents with complaints of back and leg pain      Past Medical History:   Diagnosis Date   • Arthritis    • Cardiac disease    • GERD (gastroesophageal reflux disease)    • Hypertension    • Sleep apnea        Past Surgical History:   Procedure Laterality Date   • CARDIAC SURGERY      CABG and 5 stents   • JOINT REPLACEMENT           Current Outpatient Medications:   •  amLODIPine (NORVASC) 5 mg tablet, Take 5 mg by mouth daily  , Disp: , Rfl:   •  aspirin (ECOTRIN LOW STRENGTH) 81 mg EC tablet, Take 81 mg by mouth daily, Disp: , Rfl:   •  atorvastatin (LIPITOR) 20 mg tablet, Take 40 mg by mouth daily, Disp: , Rfl:   •  Biotin 82960 MCG TABS, Take 5,000 mcg by mouth daily, Disp: , Rfl:   •  cyanocobalamin (VITAMIN B-12) 1,000 mcg tablet, Take 5,000 mcg by mouth daily, Disp: , Rfl:   •  ibuprofen (MOTRIN) 200 mg tablet, Take by mouth every 6 (six) hours as needed for mild pain, Disp: , Rfl:   •  lisinopril (ZESTRIL) 20 mg tablet, Take 20 mg by mouth 2 (two) times a day, Disp: , Rfl:   •  Magnesium 400 MG CAPS, , Disp: , Rfl:   •  methocarbamol (ROBAXIN) 500 mg tablet, Take 1 tab PO at night as needed for muscle spasms, Disp: 30 tablet, Rfl: 0  •  metoprolol succinate (TOPROL-XL) 25 mg 24 hr tablet, Take 25 mg by mouth daily, Disp: , Rfl:   •  Misc Natural Products (PROSTATE HEALTH PO), every 24 hours, Disp: , Rfl:   •  omeprazole (PriLOSEC) 20 mg delayed release capsule, Take 20 mg by mouth daily, Disp: , Rfl:   •  Pediatric Multivitamins-Fl (MULTIVITAMINS/FL PO), every 24 hours, Disp: , Rfl:   •  saccharomyces boulardii (FLORASTOR) 250 mg capsule, Take 250 mg by mouth 2 (two) times a day, Disp: , Rfl:   •  Thiamine HCl (VITAMIN B-1) 250 MG tablet, Take 250 mg by mouth daily, Disp: , Rfl:   •  Thiamine HCl (VITAMIN B-1) 250 MG tablet, every 24 hours, Disp: , Rfl:   •  Turmeric 500 MG CAPS, Take 1,000 mg by mouth daily, Disp: , Rfl:     Current Facility-Administered Medications:   •  iohexol (OMNIPAQUE) 300 mg/mL injection 50 mL, 50 mL, Epidural, Once, Adria Encinas DO  •  methylPREDNISolone acetate (DEPO-MEDROL) injection 80 mg, 80 mg, Epidural, Once, Adria Encinas DO    No Known Allergies    Physical Exam:   General: Awake, Alert, Oriented x 3, Mood and affect appropriate  Respiratory: Respirations even and unlabored  Cardiovascular: Peripheral pulses intact; no edema  Musculoskeletal Exam: Decreased range of motion lumbar spine    ASA Score: II    Patient/Chart Verification  Patient ID Verified: Verbal  ID Band Applied: No  Consents Confirmed: Procedural, To be obtained in the Pre-Procedure area  Interval H&P(within 24 hr) Complete (required for Outpatients and Surgery Admit only): To be obtained in the Pre-Procedure area  Allergies Reviewed: Yes  Anticoag/NSAID held?: NA  Currently on antibiotics?: No    Assessment:   1  Disc degeneration, lumbar    2   Foraminal stenosis of lumbar region        Plan: LESI @ L5-S1 directed to the left

## 2023-05-15 NOTE — DISCHARGE INSTRUCTIONS
Epidural Steroid Injection   WHAT YOU NEED TO KNOW:   An epidural steroid injection (MARTINEZ) is a procedure to inject steroid medicine into the epidural space  The epidural space is between your spinal cord and vertebrae  Steroids reduce inflammation and fluid buildup in your spine that may be causing pain  You may be given pain medicine along with the steroids  ACTIVITY  Do not drive or operate machinery today  No strenuous activity today - bending, lifting, etc   You may resume normal activites starting tomorrow - start slowly and as tolerated  You may shower today, but no tub baths or hot tubs  You may have numbness for several hours from the local anesthetic  Please use caution and common sense, especially with weight-bearing activities  CARE OF THE INJECTION SITE  If you have soreness or pain, apply ice to the area today (20 minutes on/20 minutes off)  Starting tomorrow, you may use warm, moist heat or ice if needed  You may have an increase or change in your discomfort for 36-48 hours after your treatment  Apply ice and continue with any pain medication you have been prescribed  Notify the [facility] if you have any of the following: redness, drainage, swelling, headache, stiff neck or fever above 100°F     SPECIAL INSTRUCTIONS  Our office will contact you in approximately 7 days for a progress report  MEDICATIONS  Continue to take all routine medications  Our office may have instructed you to hold some medications  As no general anesthesia was used in today's procedure, you should not experience any side effects related to anesthesia  If you are diabetic, the steroids used in today's injection may temporarily increase your blood sugar levels after the first few days after your injection  Please keep a close eye on your sugars and alert the doctor who manages your diabetes if your sugars are significantly high from your baseline or you are symptomatic       If you have a problem specifically related to your procedure, please call our office at [phone]  Problems not related to your procedure should be directed to your primary care physician

## 2023-05-22 ENCOUNTER — TELEPHONE (OUTPATIENT)
Dept: PAIN MEDICINE | Facility: CLINIC | Age: 78
End: 2023-05-22

## 2023-05-22 NOTE — TELEPHONE ENCOUNTER
1st attempt  Lm to cb with % improvement and pain level.     LESI @ L5-S1 directed to the left 5/15, F/u 6/12

## 2023-06-12 ENCOUNTER — OFFICE VISIT (OUTPATIENT)
Dept: PAIN MEDICINE | Facility: CLINIC | Age: 78
End: 2023-06-12
Payer: MEDICARE

## 2023-06-12 VITALS
WEIGHT: 222 LBS | TEMPERATURE: 98.2 F | BODY MASS INDEX: 31.78 KG/M2 | DIASTOLIC BLOOD PRESSURE: 82 MMHG | SYSTOLIC BLOOD PRESSURE: 140 MMHG | HEART RATE: 64 BPM | HEIGHT: 70 IN

## 2023-06-12 DIAGNOSIS — M48.061 FORAMINAL STENOSIS OF LUMBAR REGION: ICD-10-CM

## 2023-06-12 DIAGNOSIS — M51.26 LUMBAR DISC HERNIATION: ICD-10-CM

## 2023-06-12 DIAGNOSIS — M51.17 INTERVERTEBRAL DISC DISORDER WITH RADICULOPATHY OF LUMBOSACRAL REGION: Primary | ICD-10-CM

## 2023-06-12 PROCEDURE — 99213 OFFICE O/P EST LOW 20 MIN: CPT | Performed by: PHYSICIAN ASSISTANT

## 2023-06-12 NOTE — PROGRESS NOTES
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 region    2  Foraminal stenosis of lumbar region    3  Lumbar disc herniation        Plan:  While the patient was in the office today, I did have a thorough conversation regarding their chronic pain syndrome, medication management, and treatment plan options  The patient's pain is completely resolved following an L5-S1 interlaminar epidural steroid injection that was performed on 5/15/2023  He is very pleased with the results of the injection and has no new symptoms or acute issues on todays visit  At this point, he will follow-up with us on a as needed basis if the pain should return  The patient was advised to contact the office should their symptoms worsen in the interim  The patient was agreeable and verbalized an understanding  History of Present Illness: The patient is a 65 y o  male last seen on 5/15/2023 who presents for a follow up office visit in regards to back pain secondary to degenerative joint and disc disease  The patient currently reports history of chronic radicular low back pain that he presently rates a 0 out of 10 on the pain scale  On 5/15/2023 he underwent an L5-S1 epidural steroid injection and reports complete resolution of pain  He has no new symptoms or acute issues on todays visit  I have personally reviewed and/or updated the patient's past medical history, past surgical history, family history, social history, current medications, allergies, and vital signs today  Review of Systems:    Review of Systems   Respiratory: Negative for shortness of breath  Cardiovascular: Negative for chest pain  Gastrointestinal: Negative for constipation, diarrhea, nausea and vomiting  Musculoskeletal: Negative for arthralgias, gait problem, joint swelling and myalgias  Skin: Negative for rash  Neurological: Negative for dizziness, seizures and weakness     All other systems reviewed and are negative  Past Medical History:   Diagnosis Date   • Arthritis    • Cardiac disease    • GERD (gastroesophageal reflux disease)    • Hypertension    • Sleep apnea        Past Surgical History:   Procedure Laterality Date   • CARDIAC SURGERY      CABG and 5 stents   • JOINT REPLACEMENT         History reviewed  No pertinent family history      Social History     Occupational History   • Not on file   Tobacco Use   • Smoking status: Never   • Smokeless tobacco: Never   Substance and Sexual Activity   • Alcohol use: No   • Drug use: No   • Sexual activity: Not on file         Current Outpatient Medications:   •  amLODIPine (NORVASC) 5 mg tablet, Take 5 mg by mouth daily  , Disp: , Rfl:   •  aspirin (ECOTRIN LOW STRENGTH) 81 mg EC tablet, Take 81 mg by mouth daily, Disp: , Rfl:   •  atorvastatin (LIPITOR) 20 mg tablet, Take 40 mg by mouth daily, Disp: , Rfl:   •  Biotin 52075 MCG TABS, Take 5,000 mcg by mouth daily, Disp: , Rfl:   •  cyanocobalamin (VITAMIN B-12) 1,000 mcg tablet, Take 5,000 mcg by mouth daily, Disp: , Rfl:   •  ibuprofen (MOTRIN) 200 mg tablet, Take by mouth every 6 (six) hours as needed for mild pain, Disp: , Rfl:   •  lisinopril (ZESTRIL) 20 mg tablet, Take 20 mg by mouth 2 (two) times a day, Disp: , Rfl:   •  Magnesium 400 MG CAPS, , Disp: , Rfl:   •  methocarbamol (ROBAXIN) 500 mg tablet, Take 1 tab PO at night as needed for muscle spasms, Disp: 30 tablet, Rfl: 0  •  metoprolol succinate (TOPROL-XL) 25 mg 24 hr tablet, Take 25 mg by mouth daily, Disp: , Rfl:   •  Misc Natural Products (PROSTATE HEALTH PO), every 24 hours, Disp: , Rfl:   •  omeprazole (PriLOSEC) 20 mg delayed release capsule, Take 20 mg by mouth daily, Disp: , Rfl:   •  Pediatric Multivitamins-Fl (MULTIVITAMINS/FL PO), every 24 hours, Disp: , Rfl:   •  saccharomyces boulardii (FLORASTOR) 250 mg capsule, Take 250 mg by mouth 2 (two) times a day, Disp: , Rfl:   •  Thiamine HCl (VITAMIN B-1) 250 MG tablet, Take 250 mg by "mouth daily, Disp: , Rfl:   •  Thiamine HCl (VITAMIN B-1) 250 MG tablet, every 24 hours, Disp: , Rfl:   •  Turmeric 500 MG CAPS, Take 1,000 mg by mouth daily, Disp: , Rfl:     No Known Allergies    Physical Exam:    /82 (BP Location: Left arm, Patient Position: Sitting, Cuff Size: Standard)   Pulse 64   Temp 98 2 °F (36 8 °C)   Ht 5' 10\" (1 778 m)   Wt 101 kg (222 lb)   BMI 31 85 kg/m²     Constitutional:normal, well developed, well nourished, alert, in no distress and non-toxic and no overt pain behavior  Eyes:anicteric  HEENT:grossly intact  Neck:supple, symmetric, trachea midline and no masses   Pulmonary:even and unlabored  Cardiovascular:No edema or pitting edema present  Skin:Normal without rashes or lesions and well hydrated  Psychiatric:Mood and affect appropriate  Neurologic:Cranial Nerves II-XII grossly intact  Musculoskeletal:normal      Imaging  No orders to display         No orders of the defined types were placed in this encounter      "

## 2025-07-28 ENCOUNTER — OFFICE VISIT (OUTPATIENT)
Age: 80
End: 2025-07-28
Payer: MEDICARE

## 2025-07-28 VITALS — HEIGHT: 70 IN | BODY MASS INDEX: 31.08 KG/M2 | WEIGHT: 217.12 LBS

## 2025-07-28 DIAGNOSIS — M75.102 TEAR OF LEFT ROTATOR CUFF, UNSPECIFIED TEAR EXTENT, UNSPECIFIED WHETHER TRAUMATIC: Primary | ICD-10-CM

## 2025-07-28 PROCEDURE — 99213 OFFICE O/P EST LOW 20 MIN: CPT | Performed by: ORTHOPAEDIC SURGERY

## 2025-07-30 ENCOUNTER — OFFICE VISIT (OUTPATIENT)
Age: 80
End: 2025-07-30
Payer: MEDICARE

## 2025-07-30 VITALS — WEIGHT: 217 LBS | HEIGHT: 70 IN | BODY MASS INDEX: 31.07 KG/M2

## 2025-07-30 DIAGNOSIS — M75.102 TEAR OF LEFT ROTATOR CUFF, UNSPECIFIED TEAR EXTENT, UNSPECIFIED WHETHER TRAUMATIC: ICD-10-CM

## 2025-07-30 DIAGNOSIS — M17.0 BILATERAL PRIMARY OSTEOARTHRITIS OF KNEE: Primary | ICD-10-CM

## 2025-07-30 PROCEDURE — 99213 OFFICE O/P EST LOW 20 MIN: CPT | Performed by: ORTHOPAEDIC SURGERY

## 2025-07-30 PROCEDURE — 20610 DRAIN/INJ JOINT/BURSA W/O US: CPT | Performed by: ORTHOPAEDIC SURGERY

## 2025-07-30 RX ORDER — TRIAMCINOLONE ACETONIDE 40 MG/ML
40 INJECTION, SUSPENSION INTRA-ARTICULAR; INTRAMUSCULAR
Status: COMPLETED | OUTPATIENT
Start: 2025-07-30 | End: 2025-07-30

## 2025-07-30 RX ORDER — LIDOCAINE HYDROCHLORIDE 10 MG/ML
1 INJECTION, SOLUTION INFILTRATION; PERINEURAL
Status: COMPLETED | OUTPATIENT
Start: 2025-07-30 | End: 2025-07-30

## 2025-07-30 RX ADMIN — TRIAMCINOLONE ACETONIDE 40 MG: 40 INJECTION, SUSPENSION INTRA-ARTICULAR; INTRAMUSCULAR at 11:30

## 2025-07-30 RX ADMIN — LIDOCAINE HYDROCHLORIDE 1 ML: 10 INJECTION, SOLUTION INFILTRATION; PERINEURAL at 11:30

## 2025-08-11 ENCOUNTER — HOSPITAL ENCOUNTER (OUTPATIENT)
Age: 80
Discharge: HOME | End: 2025-08-11
Attending: ORTHOPAEDIC SURGERY
Payer: MEDICARE

## 2025-08-18 ENCOUNTER — OFFICE VISIT (OUTPATIENT)
Age: 80
End: 2025-08-18
Payer: MEDICARE

## 2025-08-18 VITALS — HEIGHT: 70 IN | BODY MASS INDEX: 31.44 KG/M2 | WEIGHT: 219.6 LBS

## 2025-08-18 DIAGNOSIS — S46.012D STRAIN OF LEFT ROTATOR CUFF CAPSULE, SUBSEQUENT ENCOUNTER: ICD-10-CM

## 2025-08-18 DIAGNOSIS — M19.012 ARTHRITIS OF LEFT ACROMIOCLAVICULAR JOINT: Primary | ICD-10-CM

## 2025-08-18 DIAGNOSIS — M75.122 NONTRAUMATIC COMPLETE TEAR OF LEFT ROTATOR CUFF: ICD-10-CM

## 2025-08-18 PROCEDURE — 99214 OFFICE O/P EST MOD 30 MIN: CPT | Performed by: ORTHOPAEDIC SURGERY
